# Patient Record
Sex: FEMALE | Race: BLACK OR AFRICAN AMERICAN | NOT HISPANIC OR LATINO | URBAN - METROPOLITAN AREA
[De-identification: names, ages, dates, MRNs, and addresses within clinical notes are randomized per-mention and may not be internally consistent; named-entity substitution may affect disease eponyms.]

---

## 2022-05-03 ENCOUNTER — OFFICE VISIT (OUTPATIENT)
Dept: URGENT CARE | Age: 37
End: 2022-05-03
Payer: COMMERCIAL

## 2022-05-03 VITALS
DIASTOLIC BLOOD PRESSURE: 70 MMHG | WEIGHT: 218.8 LBS | HEIGHT: 65 IN | OXYGEN SATURATION: 98 % | BODY MASS INDEX: 36.46 KG/M2 | HEART RATE: 118 BPM | SYSTOLIC BLOOD PRESSURE: 114 MMHG | TEMPERATURE: 97.8 F

## 2022-05-03 DIAGNOSIS — H61.21 IMPACTED CERUMEN OF RIGHT EAR: Primary | ICD-10-CM

## 2022-05-03 PROCEDURE — 69210 REMOVE IMPACTED EAR WAX UNI: CPT

## 2022-05-03 PROCEDURE — 99203 OFFICE O/P NEW LOW 30 MIN: CPT

## 2022-05-03 NOTE — PROGRESS NOTES
[facility]        NAME: Jany Enrique is a 29 y o  female  : 1985    MRN: [id number]  DATE: May 3, 2022  TIME: 6:55 PM    Assessment and Plan   Impacted cerumen of right ear [H61 21]  1  Impacted cerumen of right ear  Ear cerumen removal         Patient Instructions     Once cerumen removed, no signs of infection were noted  Recommend daily antihistamine  Follow up with PCP in 2-3 days  Proceed to ER if symptoms worsen  Chief Complaint     Chief Complaint   Patient presents with    Earache     pt c/o right ear pain since  pain 5-6/10  No drainage or hearing loss  History of Present Illness     29 y o  F presents with complaint of R ear pain x 2 days  Denies drainage  Denies hearing loss  Denies dizziness or balance issues  Recent URI a week or two ago  Denies fevers or chills  Review of Systems   Review of Systems   Constitutional: Negative for chills, fatigue and fever  HENT: Positive for ear pain  Negative for congestion, ear discharge, facial swelling, hearing loss, nosebleeds, postnasal drip, rhinorrhea, sinus pressure, sinus pain, sneezing, sore throat and trouble swallowing  Eyes: Negative for pain, discharge, redness and visual disturbance  Respiratory: Negative for cough, chest tightness, shortness of breath and wheezing  Cardiovascular: Negative for chest pain and palpitations  Gastrointestinal: Negative for abdominal pain, diarrhea, nausea and vomiting  Genitourinary: Negative for dysuria, flank pain, hematuria and pelvic pain  Musculoskeletal: Negative for arthralgias, back pain and myalgias  Skin: Negative for color change and rash  Neurological: Negative for dizziness, seizures, syncope, weakness, light-headedness, numbness and headaches  Psychiatric/Behavioral: Negative for confusion, hallucinations and sleep disturbance  The patient is not nervous/anxious  All other systems reviewed and are negative          Current Medications No current outpatient medications on file  Current Allergies     Allergies as of 05/03/2022    (No Known Allergies)            The following portions of the patient's history were reviewed and updated as appropriate: allergies, current medications, past family history, past medical history, past social history, past surgical history and problem list      Past Medical History:   Diagnosis Date    H/O hernia repair     History of cholecystectomy        History reviewed  No pertinent surgical history  History reviewed  No pertinent family history  Medications have been verified  Objective   /70   Pulse (!) 118   Temp 97 8 °F (36 6 °C)   Ht 5' 5" (1 651 m)   Wt 99 2 kg (218 lb 12 8 oz)   SpO2 98%   BMI 36 41 kg/m²   No LMP recorded  Physical Exam     Physical Exam  Vitals reviewed  Constitutional:       General: She is not in acute distress  Appearance: Normal appearance  She is not toxic-appearing  HENT:      Head: Normocephalic  Right Ear: External ear normal  There is impacted cerumen  Left Ear: Tympanic membrane and external ear normal   No middle ear effusion  Tympanic membrane is not perforated, erythematous or bulging  Tympanic membrane has normal mobility  Ears:      Comments: Unable to visualize R TM     Nose: No congestion or rhinorrhea  Right Sinus: No maxillary sinus tenderness or frontal sinus tenderness  Left Sinus: No maxillary sinus tenderness or frontal sinus tenderness  Mouth/Throat:      Pharynx: Uvula midline  No oropharyngeal exudate, posterior oropharyngeal erythema or uvula swelling  Tonsils: No tonsillar exudate or tonsillar abscesses  Eyes:      Extraocular Movements: Extraocular movements intact  Conjunctiva/sclera: Conjunctivae normal       Pupils: Pupils are equal, round, and reactive to light  Cardiovascular:      Rate and Rhythm: Normal rate and regular rhythm  Pulses: Normal pulses  Heart sounds: Normal heart sounds  Pulmonary:      Effort: Pulmonary effort is normal  No tachypnea, accessory muscle usage or respiratory distress  Breath sounds: Normal breath sounds and air entry  No decreased air movement  No decreased breath sounds, wheezing, rhonchi or rales  Abdominal:      General: Bowel sounds are normal       Palpations: Abdomen is soft  Tenderness: There is no abdominal tenderness  There is no right CVA tenderness or guarding  Musculoskeletal:         General: Normal range of motion  Cervical back: Normal range of motion  Lymphadenopathy:      Cervical: No cervical adenopathy  Skin:     General: Skin is warm and dry  Neurological:      General: No focal deficit present  Mental Status: She is alert  Cranial Nerves: Cranial nerves are intact  Sensory: Sensation is intact  Motor: Motor function is intact  Coordination: Coordination is intact  Gait: Gait is intact  Deep Tendon Reflexes: Reflexes are normal and symmetric  Ear cerumen removal    Date/Time: 5/3/2022 6:53 PM  Performed by: ABA Regalado  Authorized by: ABA Regalado   Universal Protocol:  Risks and benefits: risks, benefits and alternatives were discussed  Consent given by: patient  Patient understanding: patient states understanding of the procedure being performed      Patient location:  Bedside  Procedure details:     Location:  R ear    Procedure type: irrigation with instrumentation      Instrumentation: curette    Post-procedure details:     Complication:  None    Hearing quality:  Normal    Patient tolerance of procedure:   Tolerated well, no immediate complications  Comments:      No signs of bacterial infection

## 2022-05-07 ENCOUNTER — OFFICE VISIT (OUTPATIENT)
Dept: URGENT CARE | Age: 37
End: 2022-05-07
Payer: COMMERCIAL

## 2022-05-07 VITALS
OXYGEN SATURATION: 99 % | HEART RATE: 86 BPM | WEIGHT: 216 LBS | DIASTOLIC BLOOD PRESSURE: 72 MMHG | RESPIRATION RATE: 16 BRPM | BODY MASS INDEX: 34.72 KG/M2 | TEMPERATURE: 97.8 F | HEIGHT: 66 IN | SYSTOLIC BLOOD PRESSURE: 130 MMHG

## 2022-05-07 DIAGNOSIS — H92.01 RIGHT EAR PAIN: Primary | ICD-10-CM

## 2022-05-07 DIAGNOSIS — G44.209 ACUTE NON INTRACTABLE TENSION-TYPE HEADACHE: ICD-10-CM

## 2022-05-07 PROCEDURE — 99213 OFFICE O/P EST LOW 20 MIN: CPT | Performed by: PREVENTIVE MEDICINE

## 2022-05-07 NOTE — PROGRESS NOTES
[id number]        NAME: Naty Mart is a 29 y o  female  : 1985    MRN: [id number]  DATE: May 7, 2022  TIME: 1:58 PM    Assessment and Plan   Right ear pain [H92 01]  1  Right ear pain     2  Acute non intractable tension-type headache           Patient Instructions       Follow up with PCP in 3-5 days  Proceed to  ER if symptoms worsen  Chief Complaint     Chief Complaint   Patient presents with    Headache     Pt reports right sided headache and jaw pain for approx 3 days following right ear flushing  History of Present Illness       Last several days pain around the right ear in the right side of the face  Also some pains across the top of the scalp and the right forehead  Note, she mentioned she is under lot of stress  Her mother  of pancreatic cancer 6 months ago in tomorrow's mother's Day  Review of Systems   Review of Systems   Constitutional: Negative for fatigue and fever  HENT: Positive for ear pain  Negative for congestion  Neurological: Positive for headaches  Current Medications     No current outpatient medications on file  Current Allergies     Allergies as of 2022    (No Known Allergies)            The following portions of the patient's history were reviewed and updated as appropriate: allergies, current medications, past family history, past medical history, past social history, past surgical history and problem list      Past Medical History:   Diagnosis Date    H/O hernia repair     History of cholecystectomy        No past surgical history on file  No family history on file  Medications have been verified  Objective   /72   Pulse 86   Temp 97 8 °F (36 6 °C)   Resp 16   Ht 5' 6" (1 676 m)   Wt 98 kg (216 lb)   SpO2 99%   BMI 34 86 kg/m²   No LMP recorded         Physical Exam     Physical Exam  HENT:      Right Ear: Tympanic membrane normal       Left Ear: Tympanic membrane and ear canal normal  Mouth/Throat:      Mouth: Mucous membranes are moist       Pharynx: Oropharynx is clear  Eyes:      Pupils: Pupils are equal, round, and reactive to light  Lymphadenopathy:      Cervical: No cervical adenopathy  Neurological:      General: No focal deficit present  Cranial Nerves: No cranial nerve deficit  Sensory: No sensory deficit

## 2022-05-07 NOTE — PATIENT INSTRUCTIONS
The headaches might be a muscular contraction/tension-type headache  Hi getting some regular massages of the head neck  You can use ibuprofen and Tylenol for pain

## 2022-05-19 ENCOUNTER — OFFICE VISIT (OUTPATIENT)
Dept: INTERNAL MEDICINE CLINIC | Facility: OTHER | Age: 37
End: 2022-05-19
Payer: COMMERCIAL

## 2022-05-19 VITALS
WEIGHT: 217 LBS | SYSTOLIC BLOOD PRESSURE: 134 MMHG | BODY MASS INDEX: 34.87 KG/M2 | HEART RATE: 83 BPM | HEIGHT: 66 IN | OXYGEN SATURATION: 99 % | RESPIRATION RATE: 18 BRPM | TEMPERATURE: 97.8 F | DIASTOLIC BLOOD PRESSURE: 78 MMHG

## 2022-05-19 DIAGNOSIS — G44.201 ACUTE INTRACTABLE TENSION-TYPE HEADACHE: Primary | ICD-10-CM

## 2022-05-19 DIAGNOSIS — Z97.5 NEXPLANON IN PLACE: ICD-10-CM

## 2022-05-19 PROBLEM — G44.209 TENSION TYPE HEADACHE: Status: ACTIVE | Noted: 2022-05-19

## 2022-05-19 PROCEDURE — 99203 OFFICE O/P NEW LOW 30 MIN: CPT | Performed by: INTERNAL MEDICINE

## 2022-05-19 RX ORDER — METHYLPREDNISOLONE 4 MG/1
TABLET ORAL
Qty: 21 EACH | Refills: 0 | Status: SHIPPED | OUTPATIENT
Start: 2022-05-19

## 2022-05-19 RX ORDER — IBUPROFEN 800 MG/1
800 TABLET ORAL
COMMUNITY
Start: 2022-05-09

## 2022-05-19 RX ORDER — SUMATRIPTAN 50 MG/1
50 TABLET, FILM COATED ORAL ONCE AS NEEDED
Qty: 9 TABLET | Refills: 0 | Status: SHIPPED | OUTPATIENT
Start: 2022-05-19

## 2022-05-19 RX ORDER — SUMATRIPTAN 50 MG/1
50 TABLET, FILM COATED ORAL ONCE AS NEEDED
Qty: 9 TABLET | Refills: 0 | Status: SHIPPED | OUTPATIENT
Start: 2022-05-19 | End: 2022-05-19

## 2022-05-19 NOTE — ASSESSMENT & PLAN NOTE
Will prescribe Medrol Dosepak, and p r n  sumatriptan for breakthrough headaches  Discussed adequate oral hydration with following a healthy diet and rest full sleep  She is to contact our office for persistent or worsening symptoms  She is currently breastfeeding to which I recommended she hold breast feeding for the next 10 days while on steroids and sumatriptan

## 2022-05-19 NOTE — PROGRESS NOTES
Assessment/Plan:    Tension type headache  Will prescribe Medrol Dosepak, and p r n  sumatriptan for breakthrough headaches  Discussed adequate oral hydration with following a healthy diet and rest full sleep  She is to contact our office for persistent or worsening symptoms  She is currently breastfeeding to which I recommended she hold breast feeding for the next 10 days while on steroids and sumatriptan  Nexplanon in place  Scheduled for removal today  Diagnoses and all orders for this visit:    Acute intractable tension-type headache  -     methylPREDNISolone 4 MG tablet therapy pack; Use as directed on package  -     Discontinue: SUMAtriptan (Imitrex) 50 mg tablet; Take 1 tablet (50 mg total) by mouth once as needed for migraine for up to 1 dose  -     SUMAtriptan (Imitrex) 50 mg tablet; Take 1 tablet (50 mg total) by mouth once as needed for migraine for up to 1 dose    Nexplanon in place    Other orders  -     etonogestrel (NEXPLANON) subdermal implant; Inject under the skin  -     ibuprofen (MOTRIN) 800 mg tablet; Take 800 mg by mouth every 6 to 8 hours as needed for pain                Subjective:      Patient ID: Fernanda Ruiz is a 29 y o  female  Chief Complaint   Patient presents with   [facility]    Headache      headache for about a week and a half  Has other factures that maybe contributing    hm     Hep c, phq, cervical screening, HIV, annual exam, bmi f/u plan       29year old female is seen today with concern for 1 5 weeks of headaches  She has Nexplanon implantation (since 2 years) for birth control however is having it removed today due to side effects (mood swings, headaches)  This is the second consecutive month that she has menstruated  She did not have a headache last month during menstruation  She had a right ear ache 2 weeks ago to which she underwent evaluation at an urgent care to which no infection or cerumen impaction was appreciated     She also underwent a tooth extraction, right upper molar  Her headaches are localized to the bilateral parietal, temporal, and occipital headache  She has been taking ibuprofen for her headaches  Headache      The following portions of the patient's history were reviewed and updated as appropriate: allergies, current medications, past family history, past medical history, past social history, past surgical history and problem list     Review of Systems   Constitutional: Negative for activity change, appetite change, chills, diaphoresis, fatigue and fever  HENT: Negative for congestion, postnasal drip, rhinorrhea, sinus pressure, sinus pain, sneezing and sore throat  Eyes: Negative for visual disturbance  Respiratory: Negative for apnea, cough, choking, chest tightness, shortness of breath and wheezing  Cardiovascular: Negative for chest pain, palpitations and leg swelling  Gastrointestinal: Negative for abdominal distention, abdominal pain, anal bleeding, blood in stool, constipation, diarrhea, nausea and vomiting  Endocrine: Negative for cold intolerance and heat intolerance  Genitourinary: Negative for difficulty urinating, dysuria and hematuria  Musculoskeletal: Negative  Skin: Negative  Neurological: Positive for headaches  Negative for dizziness, weakness, light-headedness and numbness  Hematological: Negative for adenopathy  Psychiatric/Behavioral: Negative for agitation, sleep disturbance and suicidal ideas  All other systems reviewed and are negative          Past Medical History:   Diagnosis Date    H/O hernia repair     History of cholecystectomy          Current Outpatient Medications:     methylPREDNISolone 4 MG tablet therapy pack, Use as directed on package, Disp: 21 each, Rfl: 0    SUMAtriptan (Imitrex) 50 mg tablet, Take 1 tablet (50 mg total) by mouth once as needed for migraine for up to 1 dose, Disp: 9 tablet, Rfl: 0    etonogestrel (NEXPLANON) subdermal implant, Inject under the skin, Disp: , Rfl:     ibuprofen (MOTRIN) 800 mg tablet, Take 800 mg by mouth every 6 to 8 hours as needed for pain, Disp: , Rfl:     Allergies   Allergen Reactions    Sesame Seed (Diagnostic) - Food Allergy Anaphylaxis       Social History   Past Surgical History:   Procedure Laterality Date    DILATION AND CURETTAGE OF UTERUS  2008     Family History   Problem Relation Age of Onset    Pancreatic cancer Mother     Diabetes Paternal Grandmother     Dialysis Paternal Grandmother        Objective:  /78 (BP Location: Left arm, Patient Position: Sitting, Cuff Size: Large)   Pulse 83   Temp 97 8 °F (36 6 °C) (Temporal)   Resp 18   Ht 5' 6" (1 676 m)   Wt 98 4 kg (217 lb)   SpO2 99%   BMI 35 02 kg/m²     No results found for this or any previous visit (from the past 1344 hour(s))  Physical Exam  Vitals and nursing note reviewed  Constitutional:       General: She is not in acute distress  Appearance: She is well-developed  She is not diaphoretic  HENT:      Head: Normocephalic and atraumatic  Eyes:      General:         Right eye: No discharge  Left eye: No discharge  Conjunctiva/sclera: Conjunctivae normal       Pupils: Pupils are equal, round, and reactive to light  Neck:      Thyroid: No thyromegaly  Vascular: No JVD  Cardiovascular:      Rate and Rhythm: Normal rate and regular rhythm  Heart sounds: Normal heart sounds  No murmur heard  No friction rub  No gallop  Pulmonary:      Effort: Pulmonary effort is normal  No respiratory distress  Breath sounds: Normal breath sounds  No wheezing or rales  Chest:      Chest wall: No tenderness  Abdominal:      General: There is no distension  Palpations: Abdomen is soft  Tenderness: There is no abdominal tenderness  Musculoskeletal:         General: No tenderness or deformity  Normal range of motion  Cervical back: Normal range of motion and neck supple  Lymphadenopathy:      Cervical: No cervical adenopathy  Skin:     General: Skin is warm and dry  Coloration: Skin is not pale  Findings: No erythema or rash  Neurological:      Mental Status: She is alert and oriented to person, place, and time  Cranial Nerves: No cranial nerve deficit  Coordination: Coordination normal    Psychiatric:         Behavior: Behavior normal          Thought Content:  Thought content normal          Judgment: Judgment normal

## 2022-07-07 ENCOUNTER — TELEPHONE (OUTPATIENT)
Dept: ADMINISTRATIVE | Facility: OTHER | Age: 37
End: 2022-07-07

## 2022-07-07 NOTE — TELEPHONE ENCOUNTER
----- Message from Donn Ballesteros MA sent at 7/7/2022  7:59 AM EDT -----  Regarding: cervical screening  07/07/22 8:00 AM    Hello, our patient Adolph Prince has had Pap Smear (HPV) aka Cervical Cancer Screening completed/performed  Please assist in updating the patient chart by pulling the Care Everywhere (CE) document  The date of service is 7/29/20       Thank you,  Donn Ballesteros, [address]

## 2022-09-10 ENCOUNTER — APPOINTMENT (OUTPATIENT)
Dept: LAB | Age: 37
End: 2022-09-10
Payer: COMMERCIAL

## 2022-09-10 DIAGNOSIS — Z32.01 PREGNANCY EXAMINATION OR TEST, POSITIVE RESULT: ICD-10-CM

## 2022-09-14 ENCOUNTER — APPOINTMENT (OUTPATIENT)
Dept: LAB | Age: 37
End: 2022-09-14
Payer: COMMERCIAL

## 2022-09-14 DIAGNOSIS — Z32.01 PREGNANCY EXAMINATION OR TEST, POSITIVE RESULT: Primary | ICD-10-CM

## 2022-09-14 LAB
ABO GROUP BLD: NORMAL
B-HCG SERPL-ACNC: >1000 MIU/ML
BLD GP AB SCN SERPL QL: NEGATIVE
RH BLD: POSITIVE
SPECIMEN EXPIRATION DATE: NORMAL

## 2022-09-14 PROCEDURE — 86901 BLOOD TYPING SEROLOGIC RH(D): CPT

## 2022-09-14 PROCEDURE — 86900 BLOOD TYPING SEROLOGIC ABO: CPT

## 2022-09-14 PROCEDURE — 84702 CHORIONIC GONADOTROPIN TEST: CPT

## 2022-09-14 PROCEDURE — 36415 COLL VENOUS BLD VENIPUNCTURE: CPT

## 2022-09-14 PROCEDURE — 86850 RBC ANTIBODY SCREEN: CPT

## 2022-10-22 ENCOUNTER — APPOINTMENT (OUTPATIENT)
Dept: LAB | Age: 37
End: 2022-10-22
Payer: COMMERCIAL

## 2022-10-22 DIAGNOSIS — Z36.89 SCREENING FOR PREGNANCY-ASSOCIATED PLASMA PROTEIN A: ICD-10-CM

## 2022-10-22 LAB
EXTERNAL SYPHILIS TOTAL IGG/IGM SCREENING: NORMAL
GLUCOSE 1H P 50 G GLC PO SERPL-MCNC: 132 MG/DL (ref 40–134)
HBV SURFACE AG SER QL: NORMAL
HCV AB SER QL: NORMAL
RUBV IGG SERPL IA-ACNC: 66.1 IU/ML

## 2022-10-22 PROCEDURE — 87389 HIV-1 AG W/HIV-1&-2 AB AG IA: CPT

## 2022-10-22 PROCEDURE — 86762 RUBELLA ANTIBODY: CPT

## 2022-10-22 PROCEDURE — 87340 HEPATITIS B SURFACE AG IA: CPT

## 2022-10-22 PROCEDURE — 82950 GLUCOSE TEST: CPT

## 2022-10-22 PROCEDURE — 86592 SYPHILIS TEST NON-TREP QUAL: CPT

## 2022-10-22 PROCEDURE — 86787 VARICELLA-ZOSTER ANTIBODY: CPT

## 2022-10-22 PROCEDURE — 86803 HEPATITIS C AB TEST: CPT

## 2022-10-23 LAB — VZV IGG SER QL IA: NORMAL

## 2022-10-24 LAB
HIV 1+2 AB+HIV1 P24 AG SERPL QL IA: NORMAL
RPR SER QL: NORMAL

## 2022-10-25 LAB
EXTERNAL CHLAMYDIA SCREEN: NEGATIVE
EXTERNAL GONORRHEA SCREEN: NEGATIVE

## 2022-11-29 PROBLEM — Z97.5 NEXPLANON IN PLACE: Status: RESOLVED | Noted: 2022-05-19 | Resolved: 2022-11-29

## 2022-12-01 ENCOUNTER — OFFICE VISIT (OUTPATIENT)
Dept: OBGYN CLINIC | Facility: CLINIC | Age: 37
End: 2022-12-01

## 2022-12-01 VITALS
BODY MASS INDEX: 37.83 KG/M2 | SYSTOLIC BLOOD PRESSURE: 120 MMHG | WEIGHT: 235.4 LBS | DIASTOLIC BLOOD PRESSURE: 62 MMHG | HEIGHT: 66 IN

## 2022-12-01 DIAGNOSIS — O99.212 OBESITY AFFECTING PREGNANCY IN SECOND TRIMESTER: ICD-10-CM

## 2022-12-01 DIAGNOSIS — O09.42 GRAND MULTIPARITY WITH CURRENT PREGNANCY, SECOND TRIMESTER: ICD-10-CM

## 2022-12-01 DIAGNOSIS — O09.522 AMA (ADVANCED MATERNAL AGE) MULTIGRAVIDA 35+, SECOND TRIMESTER: Primary | ICD-10-CM

## 2022-12-01 DIAGNOSIS — O09.292 HISTORY OF GESTATIONAL DIABETES MELLITUS (GDM) IN PRIOR PREGNANCY, CURRENTLY PREGNANT IN SECOND TRIMESTER: ICD-10-CM

## 2022-12-01 DIAGNOSIS — Z86.32 HISTORY OF GESTATIONAL DIABETES MELLITUS (GDM) IN PRIOR PREGNANCY, CURRENTLY PREGNANT IN SECOND TRIMESTER: ICD-10-CM
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
## 2022-12-06 ENCOUNTER — OB ABSTRACT (OUTPATIENT)
Dept: OBGYN CLINIC | Facility: CLINIC | Age: 37
End: 2022-12-06

## 2022-12-06 ENCOUNTER — INITIAL PRENATAL (OUTPATIENT)
Dept: OBGYN CLINIC | Facility: CLINIC | Age: 37
End: 2022-12-06

## 2022-12-06 DIAGNOSIS — Z34.82 ENCOUNTER FOR SUPERVISION OF NORMAL PREGNANCY IN MULTIGRAVIDA IN SECOND TRIMESTER: Primary | ICD-10-CM

## 2022-12-06 NOTE — Clinical Note
Intake complete  Transfer with [facility] per patient and Brandie's office note from 12/1/2022 of 5/3/2023

## 2022-12-06 NOTE — PATIENT INSTRUCTIONS
Pregnancy Diet   WHAT YOU NEED TO KNOW:   What is a healthy diet during pregnancy? A healthy diet during pregnancy is a meal plan that provides the amount of calories and nutrients you need during pregnancy  Your body needs extra calories and nutrients to support your growing baby  You need to gain the right amount of weight for a healthy baby and pregnancy  Babies born at a healthy weight have a lower risk of certain health problems at birth and later in life  A healthy diet may help you avoid gaining too much weight  Too much weight gain may cause problems for you during your pregnancy and delivery  What should I avoid or limit while I am pregnant? Do not drink alcohol during pregnancy  Alcohol can increase your risk of a miscarriage (losing your baby)  Your baby may also be born too small and have other health problems, such as learning problems later in life  Do not eat raw or undercooked foods  Examples include meat, poultry, eggs, fish, and shellfish (shrimp, crab, lobster)  Cook leftover foods and ready-to-eat foods such as hot dogs until they are steaming hot  Do not have anything that is not pasteurized  Pasteurization is a heating process that destroys bacteria  Do not have milk, juice, or cheese that has not been pasteurized  Cheeses include Brie, feta, Camembert, blue, and [place] cheeses  Limit caffeine to avoid possible health problems  It is not clear how caffeine affects pregnancy  Your dietitian can tell you how much caffeine is okay for you to have in a day or week  Caffeine may be found in coffee, tea, cola, sports drinks, and chocolate  Limit foods that contain mercury  Mercury is naturally found in almost all types of fish and shellfish  Some types of fish absorb higher levels of mercury that can be harmful to an unborn baby  Eat only fish and shellfish that are low in mercury  Each week, you may eat up to 12 ounces of fish or shellfish that have low levels of mercury   These include shrimp, canned light tuna, salmon, pollock, and catfish  Eat only 6 ounces of albacore (white) tuna per week  Albacore tuna has more mercury than canned tuna  Do not eat shark, swordfish, ace mackerel, or tilefish  Which foods can I eat while I am pregnant? Eat a variety of foods from each of the food groups listed below  Eat healthy foods even if you take a prenatal vitamin every day  Your dietitian will tell you how many servings you should have from each food group each day to get enough calories  The amount of calories you need depends on your daily activity, your weight before pregnancy, and current weight gain  In the first trimester, you usually do not need extra calories  In the second and third trimesters, most women should eat about 300 extra calories each day  Fruits and vegetables:  Half of your plate should contain fruits and vegetables  Fruits:  Choose fresh, canned, or dried fruit as often as possible  1 cup of sliced, diced, cooked, or canned fruit (canned in light syrup or 100% juice)    1 large peach, orange, or banana    ½ cup of dried fruit    1 cup of fruit juice    Vegetables:  Eat more dark green, red, and orange vegetables  Dark green vegetables include broccoli, spinach, kaylynn lettuce, and aline greens  Examples of orange and red vegetables are carrots, sweet potatoes, winter squash, oranges, and red peppers  1 cup of cooked or raw vegetables    1 cup of vegetable juice    2 cups of raw leafy greens    Grains:  Half of the grains you eat each day should be whole grains      Whole grains:      ½ cup of cooked brown rice or cooked oatmeal    1 cup (1 ounce) of whole-grain dry cereal    1 slice of 376% whole-wheat or rye bread    3 cups of popped popcorn    Other grains:      ½ cup of cooked white rice or pasta    ½ of an English muffin    1 small flour or corn tortilla    1 mini-bagel    Dairy foods:  Choose fat-free or low-fat pasteurized dairy foods:    1½ ounces of hard cheese (mozzarella, Swiss, cheddar)    1 cup (8 ounces) of low-fat or fat-free milk or yogurt    1 cup of low-fat frozen yogurt or pudding    Meat and other protein sources:  Choose lean meats and poultry  Bake, broil, and grill meat instead of frying it  Include a variety of mercury-free seafood in place of some meat and poultry each week  Eat a variety of protein foods:    ½ ounce of nuts (12 almonds, 24 pistachios, 7 walnut halves) or 1 tablespoon of peanut butter (1 ounce)    ¼ cup of soy tofu or tempeh (1 ounce)    1 egg    ¼ cup of cooked dried beans, peas, or lentils (1 ounce)    1 small chicken breast or 1 small trout (about 3 ounces)    1 salmon steak (4 to 6 ounces)    1 small lean hamburger (2 to 3 ounces)    Fats:  Limit saturated fats, trans fats, and cholesterol  These unhealthy fats are found in shortening, butter, stick margarine, and animal fat  Choose healthy fats such as polyunsaturated and monounsaturated fats:    1 tablespoon of canola, olive, corn, sunflower, or soybean oil    1 tablespoon of soft margarine    1 teaspoon of mayonnaise    2 tablespoons of salad dressing    ½ of an avocado    What do I need to know about vitamin and mineral supplements? Your healthcare provider will tell you if you need a supplement and the type you should take  Talk to your provider before you take any other kind of supplement, including herbal (natural) supplements  The following are general guidelines:  Folic acid is one of the most important vitamins during pregnancy  Your prenatal vitamins will also contain folic acid  Make sure you take your prenatal vitamin every day  If you forget to take your vitamin, do not take double the amount the next day  You need at least 564 mcg of folic acid each day before you get pregnant  Folic acid helps to form your baby's brain and spinal cord in early pregnancy  During pregnancy, your daily need for folic acid increases to about 600 mcg   Get folic acid each day by eating citrus fruits and juices, green leafy vegetables, liver, or dried beans  Folic acid is also added to foods such as breakfast cereals, bread products, flour, and pasta  You need about 30 mg of iron each day during pregnancy  Iron is a mineral the body needs to make hemoglobin, which is a part of red blood cells  Hemoglobin helps your blood carry oxygen from the lungs to the rest of your body  Foods that are good sources of iron are meat, poultry, fish, beans, spinach, and fortified cereals and breads  Your body will absorb iron better from non-meat sources if you have a source of vitamin C at the same time  Drink tea and coffee separately from iron-fortified foods and iron supplements  Calcium and vitamin D needs go up during pregnancy  Women who do not eat dairy products may need a calcium and vitamin D supplement  Talk to your dietitian about calcium supplements if you do not regularly eat good sources of calcium  The amount of calcium you need is about 1,300 mg if you are between 15and 25years old and 1,000 mg if you are 23to 48years old  If you cannot drink milk or eat dairy foods, try lactose-free or lactose-reduced milk or calcium-fortified soy milk  Ask your dietitian about pills you can take to help you digest milk products  Eat other drinks and foods that are fortified with calcium, such as orange juice  What diet changes may help morning sickness? Morning sickness is common during the first few months of pregnancy  You may feel nauseated, and you may vomit several times each day  To improve symptoms of morning sickness, eat small meals often instead of 3 large meals  Foods high in carbohydrate, such as crackers, dry toast, and pasta, may be easier for you to eat  Drink liquids between meals rather than with meals  What diet changes may help constipation? A high-fiber diet can improve the symptoms of constipation   Whole-grain breakfast cereals, whole-grain breads, and prune juice are high in fiber  Raw fruits and vegetables, and cooked beans are also good sources of fiber  It may also be helpful to increase your intake of fluids and get regular physical activity  Talk with your healthcare provider before you begin any exercise program        What diet changes may help heartburn? To improve the symptoms of heartburn, do not lie down right after you eat  When you do lie down, sleep with your head slightly elevated  Eat small, frequent meals instead of 3 large meals  It may also be helpful to avoid caffeine, chocolate, and spicy foods  What other healthy guidelines should I follow? Make sure you get enough protein, vitamin B12, and iron if you are a vegetarian or vegan  Some non-meat sources of these nutrients are fortified cereals, nut butters, soy products (tofu and soymilk), nuts, grains, and legumes  These nutrients are also found in eggs and milk products  Talk to your dietitian about how to manage cravings for certain foods  Foods that are high in calories, fat, and sugar should not replace healthy food choices  Some women have cravings for unusual substances such as óscar, dirt, laundry starch, ice, and chalk  This condition is called pica  These may lead to health problems such as anemia and cause other health problems  When should I call my dietitian or obstetrician? You are losing weight without trying  You have cravings for substances such as óscar, dirt, laundry starch, or ice  You have questions or concerns about your condition or care  CARE AGREEMENT:   You have the right to help plan your care  Discuss treatment options with your healthcare provider to decide what care you want to receive  You always have the right to refuse treatment  The above information is an  only  It is not intended as medical advice for individual conditions or treatments   Talk to your doctor, nurse or pharmacist before following any medical regimen to see if it is safe and effective for you  © Copyright [employer] 2022 Information is for End User's use only and may not be sold, redistributed or otherwise used for commercial purposes  All illustrations and images included in CareNotes® are the copyrighted property of A D A M , Inc  or [address]  Pregnancy   WHAT YOU NEED TO KNOW:   What do I need to know about pregnancy? A normal pregnancy lasts about 40 weeks  The first trimester lasts from your last period through the 12th week of pregnancy  The second trimester lasts from the 13th week through the 23rd week  The third trimester lasts from the 24th week until your baby is born  If you know the date of your last period, your healthcare provider can estimate your due date  You may give birth to your baby any time from 37 weeks to 2 weeks after your due date  What is prenatal care? Prenatal care is a series of visits with your healthcare provider throughout your pregnancy  Prenatal care can help prevent problems during pregnancy and childbirth  At each prenatal visit, your healthcare provider will weigh you and check your blood pressure  He or she will also check your baby's heartbeat and growth  You may need the following at some visits:  A pelvic exam  allows your healthcare provider to see your cervix (the bottom part of your uterus)  Your provider will use a speculum to open your vagina  He or she will check the size and shape of your uterus  At your first prenatal visit, you may also have a Pap smear  This is a test to check your cervix for abnormal cells  Blood tests  may be done to check for any of the following:    Gestational diabetes or anemia (low iron level)    Blood type or Rh factor, or certain birth defects    Immunity to certain diseases, such as chickenpox or rubella    An infection, such as a sexually transmitted infection, HIV, or hepatitis B    Hepatitis B  may need to be prevented or treated   Hepatitis B is inflammation of the liver caused by the hepatitis B virus (HBV)  HBV can spread from a mother to her baby during delivery  You will be checked for HBV as early as possible in the first trimester of each pregnancy  You need the test even if you received the hepatitis B vaccine or were tested before  You may need to have an HBV infection treated before you give birth  Urine tests  may also be done to check for sugar and protein  These can be signs of gestational diabetes or preeclampsia  Urine tests may also be done to check for signs of infection  A gestational diabetes screen  may be done  Your healthcare provider may order either a 1-step or 2-step oral glucose tolerance test (OGTT)  1-step OGTT:  Your blood sugar level will be tested after you have not eaten for 8 hours (fasting)  You will then be given a glucose drink  Your level will be tested again 1 hour and 2 hours after you finish the drink  2-step OGTT:  You do not have to fast for the first part of the test  You will have the glucose drink at any time of day  Your blood sugar level will be checked 1 hour later  If your blood sugar is higher than a certain level, another test will be ordered  You will fast and your blood sugar level will be tested  You will have the glucose drink  Your blood will be tested again 1 hour, 2 hours, and 3 hours after you finish the glucose drink  A fetal ultrasound  shows pictures of your baby inside your uterus  The pictures are used to check your baby's development, movement, and position  Genetic disorder screening tests  may be offered to you  These tests check your baby's risk for genetic disorders such as Down syndrome  A screening test may include blood tests and an ultrasound  Blood tests may be used to check your DNA or your partner's DNA  Genetic tests are not always accurate or complete  Your baby may be born with a genetic disorder that did not show up in the tests   Talk to your healthcare provider about any concerns you have with genetic testing  What can I do to have a healthy pregnancy? Eat a variety of healthy foods  Healthy foods include fruits, vegetables, whole-grain breads, low-fat dairy foods, beans, lean meats, and fish  Drink liquids as directed  Ask how much liquid to drink each day and which liquids are best for you  Limit caffeine to less than 200 milligrams each day  Limit your intake of fish to 2 servings each week  Choose fish low in mercury such as canned light tuna, shrimp, crab, salmon, cod, or tilapia  Do not  eat fish high in mercury such as swordfish, tilefish, ace mackerel, and shark  Take prenatal vitamins as directed  Your need for certain vitamins and minerals, such as folic acid, increases during pregnancy  Prenatal vitamins provide some of the extra vitamins and minerals you need  Prenatal vitamins may also help to decrease the risk for certain birth defects  Ask how much weight you should gain during your pregnancy  Too much or too little weight gain can be unhealthy for you and your baby  Talk to your healthcare provider about exercise  Moderate exercise can help you stay fit  Your healthcare provider will help you plan an exercise program that is safe for you during pregnancy  Do not smoke  Smoking increases your risk for a miscarriage and heart and blood vessel problems  Smoking can cause your baby to be born too early or weigh less at birth  Quit smoking as soon as you think you might be pregnant  Ask your healthcare provider for information if you need help quitting  Do not drink alcohol  Alcohol passes from your body to your baby through the placenta  It can affect your baby's brain development and cause fetal alcohol syndrome (FAS)  FAS is a group of conditions that causes mental, behavior, and growth problems  Talk to your healthcare provider before you take any medicines    Many medicines may harm your baby if you take them when you are pregnant  Do not take any medicines, vitamins, herbs, or supplements without first talking to your healthcare provider  Never use illegal or street drugs (such as marijuana or cocaine) while you are pregnant  What body changes may happen during my pregnancy? Breast changes  you will experience include tenderness and tingling during the early part of your pregnancy  Your breasts will become larger  You may need to use a support bra  You may see a thin, yellow fluid, called colostrum, leak from your nipples during the second trimester  Colostrum is a liquid that changes to milk about 3 days after you give birth  Skin changes and stretch marks  may occur during your pregnancy  You may have red marks, called stretch marks, on your skin  Stretch marks will usually fade after pregnancy  Use lotion if your skin is dry and itchy  The skin on your face, around your nipples, and below your belly button may darken  Most of the time, your skin will return to its normal color after your baby is born  Morning sickness  is nausea and vomiting that can happen at any time of day  Avoid fatty and spicy foods  Eat small meals throughout the day instead of large meals  Domi may help to decrease nausea  Ask your healthcare provider about other ways of decreasing nausea and vomiting  Heartburn  may be caused by changes in your hormones during pregnancy  Your growing uterus may also push your stomach upward and force stomach acid to back up into your esophagus  Eat 4 or 5 small meals each day instead of large meals  Avoid spicy foods  Avoid eating right before bedtime  Constipation  may develop during your pregnancy  To treat constipation, eat foods high in fiber such as fiber cereals, beans, fruits, vegetables, whole-grain breads, and prune juice  Get regular exercise and drink plenty of water  Your healthcare provider may also suggest a fiber supplement to soften your bowel movements   Talk to your healthcare provider before you use any medicines to decrease constipation  Hemorrhoids  are enlarged veins in the rectal area  They may cause pain, itching, and bright red bleeding from your rectum  To decrease your risk for hemorrhoids, prevent constipation and do not strain to have a bowel movement  If you have hemorrhoids, soak in a tub of warm water to ease discomfort  Ask your healthcare provider how you can treat hemorrhoids  Leg cramps and swelling  may be caused by low calcium levels or the added weight of pregnancy  Raise your legs above the level of your heart to decrease swelling  During a leg cramp, stretch or massage the muscle that has the cramp  Heat may help decrease pain and muscle spasms  Apply heat on your muscle for 20 to 30 minutes every 2 hours for as many days as directed  Back pain  may occur as your baby grows  Do not stand for long periods of time or lift heavy items  Use good posture while you stand, squat, or bend  Wear low-heeled shoes with good support  Rest may also help to relieve back pain  Ask your healthcare provider about exercises you can do to strengthen your back muscles  What are some safety tips during pregnancy? Avoid hot tubs and saunas  Do not use a hot tub or sauna while you are pregnant, especially during your first trimester  Hot tubs and saunas may raise your baby's temperature and increase the risk for birth defects  Avoid toxoplasmosis  This is an infection caused by eating raw meat or being around infected cat feces  It can cause birth defects, miscarriages, and other problems  Wash your hands after you touch raw meat  Make sure any meat is well-cooked before you eat it  Avoid raw eggs and unpasteurized milk  Use gloves or ask someone else to clean your cat's litter box while you are pregnant  Ask your healthcare provider about travel  The most comfortable time to travel is during the second trimester   Ask your healthcare provider if you can travel after 36 weeks  You may not be able to travel in an airplane after 36 weeks  He or she may also recommend that you avoid long road trips  When should I seek immediate care? You develop a severe headache that does not go away  You have new or increased vision changes, such as blurred or spotted vision  You have new or increased swelling in your face or hands  You have pain or cramping in your abdomen or low back  You have vaginal bleeding  When should I call my doctor or obstetrician? You have abdominal cramps, pressure, or tightening  You have a change in vaginal discharge  You cannot keep food or drinks down, and you are losing weight  You have chills or a fever  You have vaginal itching, burning, or pain  You have yellow, green, white, or foul-smelling vaginal discharge  You have pain or burning when you urinate, less urine than usual, or pink or bloody urine  You have questions or concerns about your condition or care  CARE AGREEMENT:   You have the right to help plan your care  Learn about your health condition and how it may be treated  Discuss treatment options with your healthcare providers to decide what care you want to receive  You always have the right to refuse treatment  The above information is an  only  It is not intended as medical advice for individual conditions or treatments  Talk to your doctor, nurse or pharmacist before following any medical regimen to see if it is safe and effective for you  © Copyright [employer] 2022 Information is for End User's use only and may not be sold, redistributed or otherwise used for commercial purposes  All illustrations and images included in CareNotes® are the copyrighted property of [employer]  or [employer]  Nausea and Vomiting in Pregnancy   WHAT YOU NEED TO KNOW:   What do I need to know about nausea and vomiting in pregnancy?   Nausea and vomiting can happen any time of day  These symptoms usually start before the 9th week of pregnancy, and end by the 14th week (second trimester)  Some women can have nausea and vomiting for a longer time  These symptoms can affect some women throughout the entire pregnancy  Nausea and vomiting do not harm your baby  These symptoms can make it hard for you to do your daily activities  What causes or increases my risk for nausea and vomiting in pregnancy? The cause of nausea and vomiting in pregnancy is not known  Pregnancy causes changes in your hormones that may lead to nausea and vomiting  The following may increase your risk of nausea and vomiting:  Being pregnant with more than one baby    Nausea and vomiting in a past pregnancy    Having a sister or mother who had nausea and vomiting during pregnancy    History of migraine headaches or motion sickness    Being pregnant with a female baby    How is nausea and vomiting in pregnancy treated? Treatment for nausea and vomiting in pregnancy is usually not needed  You can make changes in the foods you eat and in your activities to help manage your symptoms  You may need to try several things to learn what works for you  Talk to your healthcare provider if your symptoms do not decrease with the changes suggested below  You may need vitamin B6 and medicine if these changes do not help, or your symptoms become severe  What nutrition changes can I make to manage nausea and vomiting? Eat small meals throughout the day instead of 3 large meals  You may be more likely to have nausea and vomiting when your stomach is empty  Eat foods that are low in fat and high in protein  Examples are lean meat, beans, turkey, and chicken without the skin  Eat a small snack, such as crackers, dry cereal, or a small sandwich before you go to bed  Eat some crackers or dry toast before you get out of bed in the morning  Get out of bed slowly   Sudden movements could cause you to get dizzy and nauseated  Eat bland foods when you feel nauseated  Examples of bland foods include dry toast, dry cereal, plain pasta, white rice, and bread  Other bland foods include saltine crackers, bananas, gelatin, and pretzels  Avoid spicy, greasy, and fried foods  Avoid any other foods that make you feel nauseated  Drink liquids that contain ginger  Drink ginger ale made with real ginger or ginger tea made with fresh grated ginger  Ginger capsules or ginger candies may also help to decrease nausea and vomiting  Drink liquids between meals instead of with meals  Wait at least 30 minutes after you eat to drink liquids  Drink small amounts of liquids often throughout the day to prevent dehydration  Ask how much liquid you should drink each day  What other changes can I make to manage nausea and vomiting? Avoid smells that bother you  Strong odors may cause nausea and vomiting to start, or make it worse  Take a short walk, turn on a fan, or try to sleep with the window open to get fresh air  When you are cooking, open windows to get rid of smells that may cause nausea  Do not brush your teeth right after you eat  if it makes you nauseated  Rest when you need to  Start activity slowly and work up to your usual routine as you start to feel better  Talk to your healthcare provider about your prenatal vitamins  Prenatal vitamins can cause nausea for some women  Try taking your prenatal vitamin at night or with a snack  If this change does not help, your healthcare provider may recommend a different type of vitamin  Do not use any medicines, vitamins, or supplements to manage your symptoms without asking your healthcare provider  Many medicines can harm an unborn baby  Light to moderate exercise  may help to decrease your symptoms  It may also help you to sleep better at night  Ask your healthcare provider about the best exercise plan for you  When should I seek immediate care?    You have signs of dehydration  Examples are dark yellow urine, dry mouth and lips, dry skin, fast heartbeat, and urinating less than usual     You have severe abdominal pain  You feel too weak or dizzy to stand up  You see blood in your vomit or bowel movements  When should I call my doctor? You vomit more than 4 times in 1 day  You have not been able to keep liquids down for more than 1 day  You lose more than 2 pounds  You have a fever  Your nausea and vomiting continue longer than 14 weeks  You have questions or concerns about your condition or care  CARE AGREEMENT:   You have the right to help plan your care  Learn about your health condition and how it may be treated  Discuss treatment options with your healthcare providers to decide what care you want to receive  You always have the right to refuse treatment  The above information is an  only  It is not intended as medical advice for individual conditions or treatments  Talk to your doctor, nurse or pharmacist before following any medical regimen to see if it is safe and effective for you  © Copyright [employer] 2022 Information is for End User's use only and may not be sold, redistributed or otherwise used for commercial purposes  All illustrations and images included in CareNotes® are the copyrighted property of [employer]  or [address]  Pregnancy Diet   WHAT YOU NEED TO KNOW:   What is a healthy diet during pregnancy? A healthy diet during pregnancy is a meal plan that provides the amount of calories and nutrients you need during pregnancy  Your body needs extra calories and nutrients to support your growing baby  You need to gain the right amount of weight for a healthy baby and pregnancy  Babies born at a healthy weight have a lower risk of certain health problems at birth and later in life  A healthy diet may help you avoid gaining too much weight   Too much weight gain may cause problems for you during your pregnancy and delivery  What should I avoid or limit while I am pregnant? Do not drink alcohol during pregnancy  Alcohol can increase your risk of a miscarriage (losing your baby)  Your baby may also be born too small and have other health problems, such as learning problems later in life  Do not eat raw or undercooked foods  Examples include meat, poultry, eggs, fish, and shellfish (shrimp, crab, lobster)  Cook leftover foods and ready-to-eat foods such as hot dogs until they are steaming hot  Do not have anything that is not pasteurized  Pasteurization is a heating process that destroys bacteria  Do not have milk, juice, or cheese that has not been pasteurized  Cheeses include Brie, feta, Camembert, blue, and [place] cheeses  Limit caffeine to avoid possible health problems  It is not clear how caffeine affects pregnancy  Your dietitian can tell you how much caffeine is okay for you to have in a day or week  Caffeine may be found in coffee, tea, cola, sports drinks, and chocolate  Limit foods that contain mercury  Mercury is naturally found in almost all types of fish and shellfish  Some types of fish absorb higher levels of mercury that can be harmful to an unborn baby  Eat only fish and shellfish that are low in mercury  Each week, you may eat up to 12 ounces of fish or shellfish that have low levels of mercury  These include shrimp, canned light tuna, salmon, pollock, and catfish  Eat only 6 ounces of albacore (white) tuna per week  Albacore tuna has more mercury than canned tuna  Do not eat shark, swordfish, ace mackerel, or tilefish  Which foods can I eat while I am pregnant? Eat a variety of foods from each of the food groups listed below  Eat healthy foods even if you take a prenatal vitamin every day  Your dietitian will tell you how many servings you should have from each food group each day to get enough calories   The amount of calories you need depends on your daily activity, your weight before pregnancy, and current weight gain  In the first trimester, you usually do not need extra calories  In the second and third trimesters, most women should eat about 300 extra calories each day  Fruits and vegetables:  Half of your plate should contain fruits and vegetables  Fruits:  Choose fresh, canned, or dried fruit as often as possible  1 cup of sliced, diced, cooked, or canned fruit (canned in light syrup or 100% juice)    1 large peach, orange, or banana    ½ cup of dried fruit    1 cup of fruit juice    Vegetables:  Eat more dark green, red, and orange vegetables  Dark green vegetables include broccoli, spinach, kaylynn lettuce, and aline greens  Examples of orange and red vegetables are carrots, sweet potatoes, winter squash, oranges, and red peppers  1 cup of cooked or raw vegetables    1 cup of vegetable juice    2 cups of raw leafy greens    Grains:  Half of the grains you eat each day should be whole grains  Whole grains:      ½ cup of cooked brown rice or cooked oatmeal    1 cup (1 ounce) of whole-grain dry cereal    1 slice of 374% whole-wheat or rye bread    3 cups of popped popcorn    Other grains:      ½ cup of cooked white rice or pasta    ½ of an English muffin    1 small flour or corn tortilla    1 mini-bagel    Dairy foods:  Choose fat-free or low-fat pasteurized dairy foods:    1½ ounces of hard cheese (mozzarella, Swiss, cheddar)    1 cup (8 ounces) of low-fat or fat-free milk or yogurt    1 cup of low-fat frozen yogurt or pudding    Meat and other protein sources:  Choose lean meats and poultry  Bake, broil, and grill meat instead of frying it  Include a variety of mercury-free seafood in place of some meat and poultry each week   Eat a variety of protein foods:    ½ ounce of nuts (12 almonds, 24 pistachios, 7 walnut halves) or 1 tablespoon of peanut butter (1 ounce)    ¼ cup of soy tofu or tempeh (1 ounce)    1 egg    ¼ cup of cooked dried beans, peas, or lentils (1 ounce)    1 small chicken breast or 1 small trout (about 3 ounces)    1 salmon steak (4 to 6 ounces)    1 small lean hamburger (2 to 3 ounces)    Fats:  Limit saturated fats, trans fats, and cholesterol  These unhealthy fats are found in shortening, butter, stick margarine, and animal fat  Choose healthy fats such as polyunsaturated and monounsaturated fats:    1 tablespoon of canola, olive, corn, sunflower, or soybean oil    1 tablespoon of soft margarine    1 teaspoon of mayonnaise    2 tablespoons of salad dressing    ½ of an avocado    What do I need to know about vitamin and mineral supplements? Your healthcare provider will tell you if you need a supplement and the type you should take  Talk to your provider before you take any other kind of supplement, including herbal (natural) supplements  The following are general guidelines:  Folic acid is one of the most important vitamins during pregnancy  Your prenatal vitamins will also contain folic acid  Make sure you take your prenatal vitamin every day  If you forget to take your vitamin, do not take double the amount the next day  You need at least 660 mcg of folic acid each day before you get pregnant  Folic acid helps to form your baby's brain and spinal cord in early pregnancy  During pregnancy, your daily need for folic acid increases to about 600 mcg  Get folic acid each day by eating citrus fruits and juices, green leafy vegetables, liver, or dried beans  Folic acid is also added to foods such as breakfast cereals, bread products, flour, and pasta  You need about 30 mg of iron each day during pregnancy  Iron is a mineral the body needs to make hemoglobin, which is a part of red blood cells  Hemoglobin helps your blood carry oxygen from the lungs to the rest of your body  Foods that are good sources of iron are meat, poultry, fish, beans, spinach, and fortified cereals and breads   Your body will absorb iron better from non-meat sources if you have a source of vitamin C at the same time  Drink tea and coffee separately from iron-fortified foods and iron supplements  Calcium and vitamin D needs go up during pregnancy  Women who do not eat dairy products may need a calcium and vitamin D supplement  Talk to your dietitian about calcium supplements if you do not regularly eat good sources of calcium  The amount of calcium you need is about 1,300 mg if you are between 15and 25years old and 1,000 mg if you are 23to 48years old  If you cannot drink milk or eat dairy foods, try lactose-free or lactose-reduced milk or calcium-fortified soy milk  Ask your dietitian about pills you can take to help you digest milk products  Eat other drinks and foods that are fortified with calcium, such as orange juice  What diet changes may help morning sickness? Morning sickness is common during the first few months of pregnancy  You may feel nauseated, and you may vomit several times each day  To improve symptoms of morning sickness, eat small meals often instead of 3 large meals  Foods high in carbohydrate, such as crackers, dry toast, and pasta, may be easier for you to eat  Drink liquids between meals rather than with meals  What diet changes may help constipation? A high-fiber diet can improve the symptoms of constipation  Whole-grain breakfast cereals, whole-grain breads, and prune juice are high in fiber  Raw fruits and vegetables, and cooked beans are also good sources of fiber  It may also be helpful to increase your intake of fluids and get regular physical activity  Talk with your healthcare provider before you begin any exercise program        What diet changes may help heartburn? To improve the symptoms of heartburn, do not lie down right after you eat  When you do lie down, sleep with your head slightly elevated  Eat small, frequent meals instead of 3 large meals   It may also be helpful to avoid caffeine, chocolate, and spicy foods  What other healthy guidelines should I follow? Make sure you get enough protein, vitamin B12, and iron if you are a vegetarian or vegan  Some non-meat sources of these nutrients are fortified cereals, nut butters, soy products (tofu and soymilk), nuts, grains, and legumes  These nutrients are also found in eggs and milk products  Talk to your dietitian about how to manage cravings for certain foods  Foods that are high in calories, fat, and sugar should not replace healthy food choices  Some women have cravings for unusual substances such as óscar, dirt, laundry starch, ice, and chalk  This condition is called pica  These may lead to health problems such as anemia and cause other health problems  When should I call my dietitian or obstetrician? You are losing weight without trying  You have cravings for substances such as óscar, dirt, laundry starch, or ice  You have questions or concerns about your condition or care  CARE AGREEMENT:   You have the right to help plan your care  Discuss treatment options with your healthcare provider to decide what care you want to receive  You always have the right to refuse treatment  The above information is an  only  It is not intended as medical advice for individual conditions or treatments  Talk to your doctor, nurse or pharmacist before following any medical regimen to see if it is safe and effective for you  © Copyright [employer] 2022 Information is for End User's use only and may not be sold, redistributed or otherwise used for commercial purposes   All illustrations and images included in CareNotes® are the copyrighted property of [employer]  or [address] [place] [address]

## 2022-12-06 NOTE — PROGRESS NOTES
OB INTAKE INTERVIEW    The patient was identified by name and date of birth and was informed that this is a virtual visit being conducted through a secure, HIPAA-complaint platform  I am in a private office space with the door closed  Patient acknowledged understanding of privacy  She agrees to proceed and is aware that she may discontinue the visit at any time  OB History    Para Term  AB Living   7 4 4 0 2 4   SAB IAB Ectopic Multiple Live Births   1 0 1 0 4      # Outcome Date GA Lbr Cesar/2nd Weight Sex Delivery Anes PTL Lv   7 Current            6 Term 2020 40w6d  3921 g (8 lb 10 3 oz) M Vag-Spont  N EDER   5 Term 2019 39w1d  3487 g (7 lb 11 oz) M Vag-Spont  N EDER   4 Ectopic 2017              Birth Comments: ? chemical pregnancy   3 Term 2011 40w0d  3751 g (8 lb 4 3 oz) F Vag-Spont  N EDER   2 Term 10/2009 40w0d  2892 g (6 lb 6 oz) M   N EDER   1 SAB 2008     SAB         Birth Comments: D&C       Estimated Date of Delivery: 5/3/2023 based on US per patient-transfer of care from [address] [place] [address]  Patient initiated release of records from [address] [place] [address]  Hanh Milan is a 40 y o  female  9 para 3 pregnant female who presents for her obstetrical intake  This pregnancy was planned  Father of the baby is involved  Pregnancy symptoms: breast tenderness and frequent urination            BMI: Body mass index is 37 99 based on last visit  Discussed appropriate weight gain in pregnancy based on pre-gravid BMI       Diabetes              Pregestational DM:  no              hx of GDM: YES-insulin mgd GDM past pregnancy-1 hour gtt done this pregnancy with routine lab work was negative              first degree relative with type 2 diabetes:no              hx of PCOS: no              prior hx of LGA/macrosomia:no per patient         Hypertension   Age 28 or older:YES   Obesity (BMI over 30):YES              Hx of chronic HTN: no              hx of gestational HTN:no hx of preeclampsia, eclampsia, or HELLP syndrome: yes-half sisiter              Family h/o preeclampsia:no  Renal Disease:no  Autoimmune disease (systemic lupus erythematosus, antiphospholipid antibody syndrome):no   Nulliparity:no              Multifetal gestation: no  More than 10 year pregnancy interval:no  Previous IUGR, low birthweight or small for gestational age:no     Infection Screening              Does the pt have a hx of MRSA? no              Recent travel outside of [place]? no     Thyroid- if yes order TSH with reflex T4  History of thyroid disease no    Bleeding Disorder or Hx of DVT-patient or first degree relative with history of  Order the following if not done previously  (Factor V, antithrombin III, prothrombin gene mutation, protein C and S Ag, lupus anticoagulant, anticardiolipin, beta-2 glycoprotein)   no    OB/GYN-  History of abnormal pap smear YES-2020, ASCUS/ neg HPV  History of HPV no  History of Herpes/HSV no  History of other STI (gonorrhea, chlamydia, trich) YES CT-2013    History of prior  YES  History of prior  no  History of  delivery prior to 36 weeks 6 days no    History of blood transfusion no  Ok for blood transfusion Yes     Substance screening- if yes outside of tobacco for her or anyone in her home-order urine drug screen    History of tobacco use-non-smoker  Currently using alcohol- no  Presently using drugs- no  Past drug use- no  IV drug use-If yes add Hep C antibody to labs-no    Genetic/MFM-    See prenatal genetic history screening in the prenatal episode for genetic history  Discussed carrier screening and consultation with MFM  Patient is not interested with previous provider  Referral to MFM given  Referral provided by Devon Womack at [facility] on 2022  Prenatal lab work was ordered by previous provider  Additional labs ordered: none at this time  Breast Feeding  Breastfeeding benefits discussed and patient does desire to breastfeed  Immunizations:  Discussed Flu, COVID, and Tdap, vaccinations  Interview education  Anticipated course of prenatal care including how and when to contact providers reviewed  HIV and other prenatal labs and testing discussed  Genetic testing discussed  Patient will check insurance coverage  Indications for ultrasonography reviewed  Use of any medications (including supplements, vitamins, herbs or OTC drugs) discussed  Reinforced daily use of prenatal vitamin  Influenza, Covid, and Tdap vaccines counseled and recommendations reviewed  Proper dental care discussed  Weight gain counseling discussed including nutrition counseling; special diet, dietary precautions (mercury, listeriosis)  Reviewed exercise recommendations and routine restrictions for activity including no lifting greater than 20 lbs   Environmental/work hazards reviewed including temperature guidelines and no prolonged stationary standing  Avoidance of saunas, hot tubs, and tanning beds reviewed  Toxoplasmosis precautions (cats/raw meat/unwashed vegetables) discussed  Tobacco/smoking, alcohol, and illicit/recreation drug usage reviewed  Travel safety precautions reviewed including avoiding travel where risk of congenitally transmitted infection(s) is high  Proper seat belt use discussed  Pregnancy packet/folder provided and review with patient  Information on childbirth classes/hospital facilities and Baby and Me resources provided  The patient was oriented to our practice, reviewed delivering physicians and [facility]  All questions were answered  Patient to return to the office for first routine prenatal appointment with the provider  This appointment is scheduled  Additional details that I feel the provider should be aware of: Brooklyn Kern to establish care on 12/1/2022  Transferred from [facility] and we are awaiting records    History of ectopic pregnancy, gestational diabetes in last pregnancy, and AMA      Interviewed by: Zelda Sifuentes LPN

## 2022-12-08 ENCOUNTER — OB ABSTRACT (OUTPATIENT)
Dept: OBGYN CLINIC | Facility: CLINIC | Age: 37
End: 2022-12-08

## 2022-12-23 ENCOUNTER — TELEPHONE (OUTPATIENT)
Dept: PERINATAL CARE | Facility: OTHER | Age: 37
End: 2022-12-23

## 2022-12-23 NOTE — TELEPHONE ENCOUNTER
Left patient a message that her MFM appointment had to be rescheduled  The new time, date and location were provided - 12/27/22  8:00  [place]  The patient has been instructed to please call us back at [phone] with any questions or concerns

## 2022-12-26 NOTE — PROGRESS NOTES
Please refer to the [facility] ultrasound report in Ob Procedures for additional information regarding today's visit

## 2022-12-27 ENCOUNTER — ROUTINE PRENATAL (OUTPATIENT)
Dept: PERINATAL CARE | Facility: OTHER | Age: 37
End: 2022-12-27

## 2022-12-27 VITALS
BODY MASS INDEX: 38.73 KG/M2 | DIASTOLIC BLOOD PRESSURE: 64 MMHG | SYSTOLIC BLOOD PRESSURE: 102 MMHG | HEART RATE: 106 BPM | HEIGHT: 66 IN | WEIGHT: 241 LBS

## 2022-12-27 DIAGNOSIS — O09.522 AMA (ADVANCED MATERNAL AGE) MULTIGRAVIDA 35+, SECOND TRIMESTER: Primary | ICD-10-CM

## 2022-12-27 DIAGNOSIS — O09.292 HISTORY OF GESTATIONAL DIABETES MELLITUS (GDM) IN PRIOR PREGNANCY, CURRENTLY PREGNANT IN SECOND TRIMESTER: ICD-10-CM

## 2022-12-27 DIAGNOSIS — O99.212 OBESITY AFFECTING PREGNANCY IN SECOND TRIMESTER: ICD-10-CM

## 2022-12-27 DIAGNOSIS — Z86.32 HISTORY OF GESTATIONAL DIABETES MELLITUS (GDM) IN PRIOR PREGNANCY, CURRENTLY PREGNANT IN SECOND TRIMESTER: ICD-10-CM

## 2022-12-27 DIAGNOSIS — Z3A.21 21 WEEKS GESTATION OF PREGNANCY: ICD-10-CM

## 2022-12-27 DIAGNOSIS — Z36.86 ENCOUNTER FOR ANTENATAL SCREENING FOR CERVICAL LENGTH: ICD-10-CM

## 2022-12-27 RX ORDER — ASPIRIN 81 MG/1
162 TABLET, CHEWABLE ORAL DAILY
Qty: 180 TABLET | Refills: 1 | Status: SHIPPED | OUTPATIENT
Start: 2022-12-27 | End: 2023-03-27

## 2022-12-27 NOTE — PROGRESS NOTES
Ultrasound Probe Disinfection    A transvaginal ultrasound was performed  Prior to use, disinfection was performed with High Level Disinfection Process (Trophon)  Probe serial number [id number]: [id number]  was used        Radha De Luna RDMS  12/27/22  8:04 AM

## 2022-12-27 NOTE — LETTER
December 27, 2022     Zeke Gil MD  [address]  [address] [place] [address]  Chalo Arauz [address]  [id number]    Patient: Diana Aguiar   YOB: 1985   Date of Visit: 12/27/2022       Dear Dr Angela Cortes: Thank you for referring Carlos Abreu to me for evaluation  Below are my notes for this consultation  If you have questions, please do not hesitate to call me  I look forward to following your patient along with you           Sincerely,        Aylin Hewitt MD        CC: No Recipients  Aylin Hewitt MD  12/26/2022 10:42 AM  Sign when Signing Visit  Please refer to the [facility] ultrasound report in Ob Procedures for additional information regarding today's visit

## 2022-12-29 ENCOUNTER — ROUTINE PRENATAL (OUTPATIENT)
Dept: OBGYN CLINIC | Facility: CLINIC | Age: 37
End: 2022-12-29

## 2022-12-29 VITALS
WEIGHT: 239.8 LBS | BODY MASS INDEX: 38.54 KG/M2 | SYSTOLIC BLOOD PRESSURE: 110 MMHG | DIASTOLIC BLOOD PRESSURE: 70 MMHG | HEIGHT: 66 IN

## 2022-12-29 DIAGNOSIS — O09.42 GRAND MULTIPARITY WITH CURRENT PREGNANCY, SECOND TRIMESTER: ICD-10-CM

## 2022-12-29 DIAGNOSIS — O99.212 OBESITY AFFECTING PREGNANCY IN SECOND TRIMESTER: ICD-10-CM

## 2022-12-29 DIAGNOSIS — Z86.32 HISTORY OF GESTATIONAL DIABETES MELLITUS (GDM) IN PRIOR PREGNANCY, CURRENTLY PREGNANT IN SECOND TRIMESTER: ICD-10-CM

## 2022-12-29 DIAGNOSIS — O09.292 HISTORY OF GESTATIONAL DIABETES MELLITUS (GDM) IN PRIOR PREGNANCY, CURRENTLY PREGNANT IN SECOND TRIMESTER: ICD-10-CM

## 2022-12-29 DIAGNOSIS — O09.522 AMA (ADVANCED MATERNAL AGE) MULTIGRAVIDA 35+, SECOND TRIMESTER: Primary | ICD-10-CM

## 2023-01-30 ENCOUNTER — ROUTINE PRENATAL (OUTPATIENT)
Dept: OBGYN CLINIC | Facility: CLINIC | Age: 38
End: 2023-01-30

## 2023-01-30 VITALS
SYSTOLIC BLOOD PRESSURE: 118 MMHG | WEIGHT: 243 LBS | HEIGHT: 66 IN | BODY MASS INDEX: 39.05 KG/M2 | DIASTOLIC BLOOD PRESSURE: 74 MMHG

## 2023-01-30 DIAGNOSIS — O99.212 OBESITY AFFECTING PREGNANCY IN SECOND TRIMESTER: ICD-10-CM

## 2023-01-30 DIAGNOSIS — Z3A.26 26 WEEKS GESTATION OF PREGNANCY: ICD-10-CM

## 2023-01-30 DIAGNOSIS — O09.522 AMA (ADVANCED MATERNAL AGE) MULTIGRAVIDA 35+, SECOND TRIMESTER: Primary | ICD-10-CM

## 2023-01-30 DIAGNOSIS — O09.292 HISTORY OF GESTATIONAL DIABETES MELLITUS (GDM) IN PRIOR PREGNANCY, CURRENTLY PREGNANT IN SECOND TRIMESTER: ICD-10-CM

## 2023-01-30 DIAGNOSIS — O09.42 GRAND MULTIPARITY WITH CURRENT PREGNANCY, SECOND TRIMESTER: ICD-10-CM

## 2023-01-30 DIAGNOSIS — Z86.32 HISTORY OF GESTATIONAL DIABETES MELLITUS (GDM) IN PRIOR PREGNANCY, CURRENTLY PREGNANT IN SECOND TRIMESTER: ICD-10-CM

## 2023-01-30 NOTE — PROGRESS NOTES
Pt is a 40 y o  [id number] 26w5d  Pregnancy is complicated by AMA, [address] multiparity, obesity, h o GDM last pregnancy  Pt reports +FM  Denies vb, lof, ctx  PTL precautions reviewed  3d trimester lab slips given  Has growth scan at 28 weeks  F/u 2 weeks

## 2023-02-14 ENCOUNTER — ULTRASOUND (OUTPATIENT)
Dept: PERINATAL CARE | Facility: OTHER | Age: 38
End: 2023-02-14

## 2023-02-14 VITALS
HEART RATE: 95 BPM | DIASTOLIC BLOOD PRESSURE: 74 MMHG | HEIGHT: 66 IN | SYSTOLIC BLOOD PRESSURE: 122 MMHG | BODY MASS INDEX: 38.99 KG/M2 | WEIGHT: 242.6 LBS

## 2023-02-14 DIAGNOSIS — O09.523 AMA (ADVANCED MATERNAL AGE) MULTIGRAVIDA 35+, THIRD TRIMESTER: Primary | ICD-10-CM

## 2023-02-14 DIAGNOSIS — O99.213 OBESITY AFFECTING PREGNANCY IN THIRD TRIMESTER: ICD-10-CM

## 2023-02-14 DIAGNOSIS — Z3A.28 28 WEEKS GESTATION OF PREGNANCY: ICD-10-CM

## 2023-02-14 NOTE — PROGRESS NOTES
The patient was seen today for an ultrasound  Please see ultrasound report (located under Ob Procedures) for additional details  Thank you very much for allowing us to participate in the care of this very nice patient  Should you have any questions, please do not hesitate to contact me  Himanshu Chacko MD [id number] [facility]  Attending Physician, Noel

## 2023-02-20 ENCOUNTER — APPOINTMENT (OUTPATIENT)
Dept: LAB | Age: 38
End: 2023-02-20

## 2023-02-20 DIAGNOSIS — O09.42 GRAND MULTIPARITY WITH CURRENT PREGNANCY, SECOND TRIMESTER: ICD-10-CM

## 2023-02-20 DIAGNOSIS — O09.292 HISTORY OF GESTATIONAL DIABETES MELLITUS (GDM) IN PRIOR PREGNANCY, CURRENTLY PREGNANT IN SECOND TRIMESTER: ICD-10-CM

## 2023-02-20 DIAGNOSIS — Z3A.26 26 WEEKS GESTATION OF PREGNANCY: ICD-10-CM

## 2023-02-20 DIAGNOSIS — Z86.32 HISTORY OF GESTATIONAL DIABETES MELLITUS (GDM) IN PRIOR PREGNANCY, CURRENTLY PREGNANT IN SECOND TRIMESTER: ICD-10-CM

## 2023-02-20 DIAGNOSIS — O99.810 ABNORMAL GLUCOSE TOLERANCE TEST (GTT) DURING PREGNANCY, ANTEPARTUM: Primary | ICD-10-CM

## 2023-02-20 DIAGNOSIS — O99.212 OBESITY AFFECTING PREGNANCY IN SECOND TRIMESTER: ICD-10-CM

## 2023-02-20 DIAGNOSIS — O09.522 AMA (ADVANCED MATERNAL AGE) MULTIGRAVIDA 35+, SECOND TRIMESTER: ICD-10-CM

## 2023-02-20 PROBLEM — O99.013 ANEMIA, ANTEPARTUM, THIRD TRIMESTER: Status: ACTIVE | Noted: 2023-02-20

## 2023-02-20 LAB
ERYTHROCYTE [DISTWIDTH] IN BLOOD BY AUTOMATED COUNT: 12.8 % (ref 11.6–15.1)
GLUCOSE 1H P 50 G GLC PO SERPL-MCNC: 152 MG/DL (ref 40–134)
HCT VFR BLD AUTO: 29.3 % (ref 34.8–46.1)
HGB BLD-MCNC: 9.5 G/DL (ref 11.5–15.4)
MCH RBC QN AUTO: 30.7 PG (ref 26.8–34.3)
MCHC RBC AUTO-ENTMCNC: 32.4 G/DL (ref 31.4–37.4)
MCV RBC AUTO: 95 FL (ref 82–98)
PLATELET # BLD AUTO: 192 THOUSANDS/UL (ref 149–390)
PMV BLD AUTO: 12.5 FL (ref 8.9–12.7)
RBC # BLD AUTO: 3.09 MILLION/UL (ref 3.81–5.12)
WBC # BLD AUTO: 8.05 THOUSAND/UL (ref 4.31–10.16)

## 2023-02-21 LAB — TREPONEMA PALLIDUM IGG+IGM AB [PRESENCE] IN SERUM OR PLASMA BY IMMUNOASSAY: NORMAL

## 2023-03-01 ENCOUNTER — APPOINTMENT (OUTPATIENT)
Dept: LAB | Facility: HOSPITAL | Age: 38
End: 2023-03-01

## 2023-03-01 ENCOUNTER — TELEPHONE (OUTPATIENT)
Dept: OBGYN CLINIC | Facility: CLINIC | Age: 38
End: 2023-03-01

## 2023-03-01 DIAGNOSIS — O09.292 HISTORY OF GESTATIONAL DIABETES MELLITUS (GDM) IN PRIOR PREGNANCY, CURRENTLY PREGNANT IN SECOND TRIMESTER: ICD-10-CM

## 2023-03-01 DIAGNOSIS — O99.810 ABNORMAL GLUCOSE TOLERANCE TEST (GTT) DURING PREGNANCY, ANTEPARTUM: ICD-10-CM

## 2023-03-01 DIAGNOSIS — Z86.32 HISTORY OF GESTATIONAL DIABETES MELLITUS (GDM) IN PRIOR PREGNANCY, CURRENTLY PREGNANT IN SECOND TRIMESTER: ICD-10-CM

## 2023-03-01 LAB
GLUCOSE 1H P 100 G GLC PO SERPL-MCNC: 173 MG/DL (ref 70–183)
GLUCOSE 2H P 100 G GLC PO SERPL-MCNC: 181 MG/DL (ref 70–155)
GLUCOSE 2H P 75 G GLC PO SERPL-MCNC: 181 MG/DL
GLUCOSE 3H P 100 G GLC PO SERPL-MCNC: 133 MG/DL (ref 70–140)
GLUCOSE P FAST SERPL-MCNC: 88 MG/DL (ref 65–94)

## 2023-03-01 NOTE — TELEPHONE ENCOUNTER
The patient called stating she received her 3 hr glucose results and would like to speak to the nurse regarding the results  Please advise

## 2023-03-02 ENCOUNTER — ROUTINE PRENATAL (OUTPATIENT)
Dept: OBGYN CLINIC | Facility: CLINIC | Age: 38
End: 2023-03-02

## 2023-03-02 VITALS
DIASTOLIC BLOOD PRESSURE: 72 MMHG | WEIGHT: 242.4 LBS | SYSTOLIC BLOOD PRESSURE: 124 MMHG | HEIGHT: 66 IN | BODY MASS INDEX: 38.96 KG/M2 | OXYGEN SATURATION: 99 %

## 2023-03-02 DIAGNOSIS — O09.293 HISTORY OF GESTATIONAL DIABETES MELLITUS (GDM) IN PRIOR PREGNANCY, CURRENTLY PREGNANT IN THIRD TRIMESTER: ICD-10-CM

## 2023-03-02 DIAGNOSIS — Z86.32 HISTORY OF GESTATIONAL DIABETES MELLITUS (GDM) IN PRIOR PREGNANCY, CURRENTLY PREGNANT IN THIRD TRIMESTER: ICD-10-CM

## 2023-03-02 DIAGNOSIS — O99.213 OBESITY AFFECTING PREGNANCY IN THIRD TRIMESTER: Primary | ICD-10-CM

## 2023-03-02 DIAGNOSIS — Z3A.31 31 WEEKS GESTATION OF PREGNANCY: ICD-10-CM

## 2023-03-02 DIAGNOSIS — O99.810 ABNORMAL GLUCOSE TOLERANCE TEST (GTT) DURING PREGNANCY, ANTEPARTUM: ICD-10-CM

## 2023-03-02 DIAGNOSIS — O09.523 AMA (ADVANCED MATERNAL AGE) MULTIGRAVIDA 35+, THIRD TRIMESTER: ICD-10-CM

## 2023-03-02 PROBLEM — O09.43: Status: ACTIVE | Noted: 2022-12-01

## 2023-03-02 NOTE — PROGRESS NOTES
Pt is a 40 y o  [id number] 31w1d  Pregnancy is complicated by AMA, [address] multiparity, obesity, h o GDM last pregnancy, elevated 1 hour gtt  Reviewed with patient that her 3 hour gtt only had 1 abnormality and she does not have GDM  She does though, have anemia with hgb 9 5  Advised she start iron  If no improvement in 4 weeks, will start IV iron  Pt reports +FM  Denies vb, lof, ctx  PTL precautions and fkc reviewed  She stopped taking her ASA, pt advised against this  Declines TDAP  3d trimester folder reviewed and given  Delivery consent reviewed and signed  28 weeks u/s shows EFW 28%  F/u 2 weeks

## 2023-03-16 ENCOUNTER — ROUTINE PRENATAL (OUTPATIENT)
Dept: OBGYN CLINIC | Facility: CLINIC | Age: 38
End: 2023-03-16

## 2023-03-16 VITALS
SYSTOLIC BLOOD PRESSURE: 118 MMHG | DIASTOLIC BLOOD PRESSURE: 72 MMHG | WEIGHT: 241.8 LBS | HEIGHT: 66 IN | HEART RATE: 91 BPM | BODY MASS INDEX: 38.86 KG/M2

## 2023-03-16 DIAGNOSIS — O09.523 AMA (ADVANCED MATERNAL AGE) MULTIGRAVIDA 35+, THIRD TRIMESTER: Primary | ICD-10-CM

## 2023-03-16 DIAGNOSIS — O09.43 GRAND MULTIPARITY WITH CURRENT PREGNANCY, ANTEPARTUM, THIRD TRIMESTER: ICD-10-CM

## 2023-03-16 DIAGNOSIS — O99.213 OBESITY AFFECTING PREGNANCY IN THIRD TRIMESTER: ICD-10-CM

## 2023-03-16 DIAGNOSIS — O99.013 ANEMIA AFFECTING PREGNANCY IN THIRD TRIMESTER: ICD-10-CM

## 2023-03-16 NOTE — PROGRESS NOTES
Pt is a 40 y o  [id number] 33w1d gestation  Pregnancy is complicated by AMA, [address] multiparity, obesity, h o GDM last pregnancy; ^ 1hr   GTT w/ normal 3h GTT  Not taking ASA, states that she discussed pros/cons with Dr Vibha Richardson  She has been taking an iron supplement as recommended by Dr Vibha Richardson  CBC order provided for recheck early 4/2023, prn IV iron  Has f/u US with MFM on 3/31/23  Baby is active, denies leakage of fluid, vaginal bleeding or uterine contractions    S=D, normal FHTs, normal BP    declined TDAP/ consent signed  RV 2w  Pt desires to take placenta home

## 2023-04-03 ENCOUNTER — ROUTINE PRENATAL (OUTPATIENT)
Dept: OBGYN CLINIC | Facility: CLINIC | Age: 38
End: 2023-04-03

## 2023-04-03 ENCOUNTER — LAB (OUTPATIENT)
Dept: LAB | Age: 38
End: 2023-04-03

## 2023-04-03 VITALS
DIASTOLIC BLOOD PRESSURE: 82 MMHG | SYSTOLIC BLOOD PRESSURE: 124 MMHG | WEIGHT: 244.8 LBS | HEIGHT: 66 IN | BODY MASS INDEX: 39.34 KG/M2

## 2023-04-03 DIAGNOSIS — Z86.32 HISTORY OF GESTATIONAL DIABETES MELLITUS (GDM) IN PRIOR PREGNANCY, CURRENTLY PREGNANT IN THIRD TRIMESTER: ICD-10-CM

## 2023-04-03 DIAGNOSIS — O99.810 ABNORMAL GLUCOSE TOLERANCE TEST (GTT) DURING PREGNANCY, ANTEPARTUM: ICD-10-CM

## 2023-04-03 DIAGNOSIS — O09.293 HISTORY OF GESTATIONAL DIABETES MELLITUS (GDM) IN PRIOR PREGNANCY, CURRENTLY PREGNANT IN THIRD TRIMESTER: ICD-10-CM

## 2023-04-03 DIAGNOSIS — O09.43 GRAND MULTIPARITY WITH CURRENT PREGNANCY, THIRD TRIMESTER: ICD-10-CM

## 2023-04-03 DIAGNOSIS — O99.213 OBESITY AFFECTING PREGNANCY IN THIRD TRIMESTER: ICD-10-CM

## 2023-04-03 DIAGNOSIS — O09.523 AMA (ADVANCED MATERNAL AGE) MULTIGRAVIDA 35+, THIRD TRIMESTER: Primary | ICD-10-CM

## 2023-04-03 DIAGNOSIS — O99.013 ANEMIA AFFECTING PREGNANCY IN THIRD TRIMESTER: ICD-10-CM

## 2023-04-03 DIAGNOSIS — O99.013 ANEMIA, ANTEPARTUM, THIRD TRIMESTER: ICD-10-CM

## 2023-04-03 PROBLEM — Z3A.35 35 WEEKS GESTATION OF PREGNANCY: Status: ACTIVE | Noted: 2023-01-30

## 2023-04-03 LAB
ERYTHROCYTE [DISTWIDTH] IN BLOOD BY AUTOMATED COUNT: 13.4 % (ref 11.6–15.1)
HCT VFR BLD AUTO: 31.7 % (ref 34.8–46.1)
HGB BLD-MCNC: 10.4 G/DL (ref 11.5–15.4)
MCH RBC QN AUTO: 30.7 PG (ref 26.8–34.3)
MCHC RBC AUTO-ENTMCNC: 32.8 G/DL (ref 31.4–37.4)
MCV RBC AUTO: 94 FL (ref 82–98)
PLATELET # BLD AUTO: 182 THOUSANDS/UL (ref 149–390)
PMV BLD AUTO: 13.2 FL (ref 8.9–12.7)
RBC # BLD AUTO: 3.39 MILLION/UL (ref 3.81–5.12)
WBC # BLD AUTO: 8.21 THOUSAND/UL (ref 4.31–10.16)

## 2023-04-03 NOTE — PROGRESS NOTES
Pt is a 40 y o  [id number] 35w5d  Pregnancy is complicated by AMA, [address] multiparity, obesity, h o GDM last pregnancy; ^ 1hr   GTT w/ normal 3h GTT (3/4 values)  Pt reports +FM  Denies vb, lof  Notes irregular ctx and pelvic pressure  Labor precautions and fkc reviewed  GBS collected  SVE: 1/20/-3, soft, posterior  Birth plan reviewed and signed

## 2023-04-06 LAB — GP B STREP DNA SPEC QL NAA+PROBE: NEGATIVE

## 2023-04-24 ENCOUNTER — ROUTINE PRENATAL (OUTPATIENT)
Dept: OBGYN CLINIC | Facility: CLINIC | Age: 38
End: 2023-04-24

## 2023-04-24 VITALS
BODY MASS INDEX: 39.05 KG/M2 | HEIGHT: 66 IN | DIASTOLIC BLOOD PRESSURE: 80 MMHG | WEIGHT: 243 LBS | SYSTOLIC BLOOD PRESSURE: 120 MMHG

## 2023-04-24 DIAGNOSIS — O99.013 ANEMIA, ANTEPARTUM, THIRD TRIMESTER: ICD-10-CM

## 2023-04-24 DIAGNOSIS — Z3A.38 38 WEEKS GESTATION OF PREGNANCY: ICD-10-CM

## 2023-04-24 DIAGNOSIS — O09.293 HISTORY OF GESTATIONAL DIABETES MELLITUS (GDM) IN PRIOR PREGNANCY, CURRENTLY PREGNANT IN THIRD TRIMESTER: ICD-10-CM

## 2023-04-24 DIAGNOSIS — O09.43 GRAND MULTIPARITY WITH CURRENT PREGNANCY, ANTEPARTUM, THIRD TRIMESTER: ICD-10-CM

## 2023-04-24 DIAGNOSIS — O09.43 GRAND MULTIPARITY WITH CURRENT PREGNANCY, THIRD TRIMESTER: ICD-10-CM

## 2023-04-24 DIAGNOSIS — O99.810 ABNORMAL GLUCOSE TOLERANCE TEST (GTT) DURING PREGNANCY, ANTEPARTUM: ICD-10-CM

## 2023-04-24 DIAGNOSIS — O99.013 ANEMIA AFFECTING PREGNANCY IN THIRD TRIMESTER: ICD-10-CM

## 2023-04-24 DIAGNOSIS — O09.523 AMA (ADVANCED MATERNAL AGE) MULTIGRAVIDA 35+, THIRD TRIMESTER: Primary | ICD-10-CM

## 2023-04-24 DIAGNOSIS — O99.213 OBESITY AFFECTING PREGNANCY IN THIRD TRIMESTER: ICD-10-CM

## 2023-04-24 DIAGNOSIS — Z86.32 HISTORY OF GESTATIONAL DIABETES MELLITUS (GDM) IN PRIOR PREGNANCY, CURRENTLY PREGNANT IN THIRD TRIMESTER: ICD-10-CM

## 2023-04-24 NOTE — PROGRESS NOTES
Pt is a 40 y o  [id number] 38w5d  Pregnancy is complicated by AMA, [address] multiparity, obesity, h o GDM last pregnancy; ^ 1hr GTT w/ normal 3h GTT  Pt reports +FM  Denies vb, lof  Notes irregular ctx  Labor precautions and fkc reviewed  SVE: 1 5/50/-3, soft, posterior  Birth plan reviewed and signed  IOL on 5/7 if no spontaneous labor   IOL consent reviewed and signed

## 2023-05-01 ENCOUNTER — ROUTINE PRENATAL (OUTPATIENT)
Dept: OBGYN CLINIC | Facility: CLINIC | Age: 38
End: 2023-05-01

## 2023-05-01 VITALS
HEIGHT: 66 IN | BODY MASS INDEX: 39.21 KG/M2 | WEIGHT: 244 LBS | DIASTOLIC BLOOD PRESSURE: 76 MMHG | SYSTOLIC BLOOD PRESSURE: 112 MMHG

## 2023-05-01 DIAGNOSIS — Z86.32 HISTORY OF GESTATIONAL DIABETES MELLITUS (GDM) IN PRIOR PREGNANCY, CURRENTLY PREGNANT IN THIRD TRIMESTER: ICD-10-CM

## 2023-05-01 DIAGNOSIS — O99.213 OBESITY AFFECTING PREGNANCY IN THIRD TRIMESTER: ICD-10-CM

## 2023-05-01 DIAGNOSIS — O09.43 GRAND MULTIPARITY WITH CURRENT PREGNANCY, THIRD TRIMESTER: ICD-10-CM

## 2023-05-01 DIAGNOSIS — O09.43 GRAND MULTIPARITY WITH CURRENT PREGNANCY, ANTEPARTUM, THIRD TRIMESTER: ICD-10-CM

## 2023-05-01 DIAGNOSIS — O99.013 ANEMIA, ANTEPARTUM, THIRD TRIMESTER: ICD-10-CM

## 2023-05-01 DIAGNOSIS — O09.293 HISTORY OF GESTATIONAL DIABETES MELLITUS (GDM) IN PRIOR PREGNANCY, CURRENTLY PREGNANT IN THIRD TRIMESTER: ICD-10-CM

## 2023-05-01 DIAGNOSIS — O09.523 AMA (ADVANCED MATERNAL AGE) MULTIGRAVIDA 35+, THIRD TRIMESTER: Primary | ICD-10-CM

## 2023-05-01 DIAGNOSIS — Z3A.39 39 WEEKS GESTATION OF PREGNANCY: ICD-10-CM

## 2023-05-01 DIAGNOSIS — O99.013 ANEMIA AFFECTING PREGNANCY IN THIRD TRIMESTER: ICD-10-CM

## 2023-05-01 NOTE — PROGRESS NOTES
Pt is a 40 y o  [id number] 39w5d  Pregnancy is complicated by AMA, [address] multiparity, obesity, h o GDM last pregnancy; ^ 1hr GTT w/ normal 3h GTT  Pt reports +FM  Denies vb, lof  Notes irregular ctx   Labor precautions and fkc reviewed  IOL on 5/7 if no spontaneous labor  Will plan on cytotec

## 2023-05-07 ENCOUNTER — HOSPITAL ENCOUNTER (OUTPATIENT)
Dept: LABOR AND DELIVERY | Facility: HOSPITAL | Age: 38
Discharge: HOME | End: 2023-05-07

## 2023-05-07 ENCOUNTER — HOSPITAL ENCOUNTER (INPATIENT)
Facility: HOSPITAL | Age: 38
LOS: 1 days | Discharge: HOME | End: 2023-05-08
Attending: OBSTETRICS & GYNECOLOGY | Admitting: OBSTETRICS & GYNECOLOGY

## 2023-05-07 DIAGNOSIS — Z3A.40 40 WEEKS GESTATION OF PREGNANCY: Primary | ICD-10-CM

## 2023-05-07 LAB
ABO GROUP BLD: NORMAL
ALBUMIN SERPL BCP-MCNC: 3.7 G/DL (ref 3.5–5)
ALP SERPL-CCNC: 136 U/L (ref 34–104)
ALT SERPL W P-5'-P-CCNC: 41 U/L (ref 7–52)
ANION GAP SERPL CALCULATED.3IONS-SCNC: 7 MMOL/L (ref 4–13)
AST SERPL W P-5'-P-CCNC: 46 U/L (ref 13–39)
BASE EXCESS BLDCOA CALC-SCNC: -2.3 MMOL/L (ref 3–11)
BASE EXCESS BLDCOV CALC-SCNC: -5.2 MMOL/L (ref 1–9)
BILIRUB SERPL-MCNC: 0.45 MG/DL (ref 0.2–1)
BLD GP AB SCN SERPL QL: NEGATIVE
BUN SERPL-MCNC: 6 MG/DL (ref 5–25)
CALCIUM SERPL-MCNC: 8.9 MG/DL (ref 8.4–10.2)
CHLORIDE SERPL-SCNC: 104 MMOL/L (ref 96–108)
CO2 SERPL-SCNC: 20 MMOL/L (ref 21–32)
CREAT SERPL-MCNC: 0.49 MG/DL (ref 0.6–1.3)
CREAT UR-MCNC: 73.8 MG/DL
ERYTHROCYTE [DISTWIDTH] IN BLOOD BY AUTOMATED COUNT: 13.6 % (ref 11.6–15.1)
GFR SERPL CREATININE-BSD FRML MDRD: 125 ML/MIN/1.73SQ M
GLUCOSE SERPL-MCNC: 75 MG/DL (ref 65–140)
HCO3 BLDCOA-SCNC: 25.3 MMOL/L (ref 17.3–27.3)
HCO3 BLDCOV-SCNC: 20 MMOL/L (ref 12.2–28.6)
HCT VFR BLD AUTO: 32.9 % (ref 34.8–46.1)
HGB BLD-MCNC: 10.9 G/DL (ref 11.5–15.4)
HOLD SPECIMEN: NORMAL
MCH RBC QN AUTO: 31 PG (ref 26.8–34.3)
MCHC RBC AUTO-ENTMCNC: 33.1 G/DL (ref 31.4–37.4)
MCV RBC AUTO: 94 FL (ref 82–98)
O2 CT VFR BLDCOA CALC: 6.7 ML/DL
OXYHGB MFR BLDCOA: 27.5 %
OXYHGB MFR BLDCOV: 70.7 %
PCO2 BLDCOA: 54 MM[HG] (ref 30–60)
PCO2 BLDCOV: 38 MM HG (ref 27–43)
PH BLDCOA: 7.29 [PH] (ref 7.23–7.43)
PH BLDCOV: 7.34 [PH] (ref 7.19–7.49)
PLATELET # BLD AUTO: 162 THOUSANDS/UL (ref 149–390)
PMV BLD AUTO: 13.4 FL (ref 8.9–12.7)
PO2 BLDCOA: 17 MM HG (ref 5–25)
PO2 BLDCOV: 34.2 MM HG (ref 15–45)
POTASSIUM SERPL-SCNC: 3.8 MMOL/L (ref 3.5–5.3)
PROT SERPL-MCNC: 6.9 G/DL (ref 6.4–8.4)
PROT UR-MCNC: 15 MG/DL
PROT/CREAT UR: 0.2 MG/G{CREAT} (ref 0–0.1)
RBC # BLD AUTO: 3.52 MILLION/UL (ref 3.81–5.12)
RH BLD: POSITIVE
SAO2 % BLDCOV: 16.6 ML/DL
SODIUM SERPL-SCNC: 131 MMOL/L (ref 135–147)
SPECIMEN EXPIRATION DATE: NORMAL
WBC # BLD AUTO: 9.82 THOUSAND/UL (ref 4.31–10.16)

## 2023-05-07 PROCEDURE — 4A1HXCZ MONITORING OF PRODUCTS OF CONCEPTION, CARDIAC RATE, EXTERNAL APPROACH: ICD-10-PCS | Performed by: OBSTETRICS & GYNECOLOGY

## 2023-05-07 PROCEDURE — 10907ZC DRAINAGE OF AMNIOTIC FLUID, THERAPEUTIC FROM PRODUCTS OF CONCEPTION, VIA NATURAL OR ARTIFICIAL OPENING: ICD-10-PCS | Performed by: OBSTETRICS & GYNECOLOGY

## 2023-05-07 RX ORDER — IBUPROFEN 600 MG/1
600 TABLET ORAL EVERY 6 HOURS PRN
Status: DISCONTINUED | OUTPATIENT
Start: 2023-05-07 | End: 2023-05-08 | Stop reason: HOSPADM

## 2023-05-07 RX ORDER — SODIUM CHLORIDE, SODIUM LACTATE, POTASSIUM CHLORIDE, CALCIUM CHLORIDE 600; 310; 30; 20 MG/100ML; MG/100ML; MG/100ML; MG/100ML
125 INJECTION, SOLUTION INTRAVENOUS CONTINUOUS
Status: DISCONTINUED | OUTPATIENT
Start: 2023-05-07 | End: 2023-05-07

## 2023-05-07 RX ORDER — CALCIUM CARBONATE 200(500)MG
1000 TABLET,CHEWABLE ORAL 3 TIMES DAILY PRN
Status: DISCONTINUED | OUTPATIENT
Start: 2023-05-07 | End: 2023-05-08 | Stop reason: HOSPADM

## 2023-05-07 RX ORDER — DIAPER,BRIEF,INFANT-TODD,DISP
1 EACH MISCELLANEOUS DAILY PRN
Status: DISCONTINUED | OUTPATIENT
Start: 2023-05-07 | End: 2023-05-08 | Stop reason: HOSPADM

## 2023-05-07 RX ORDER — DOCUSATE SODIUM 100 MG/1
100 CAPSULE, LIQUID FILLED ORAL 2 TIMES DAILY
Status: DISCONTINUED | OUTPATIENT
Start: 2023-05-07 | End: 2023-05-08 | Stop reason: HOSPADM

## 2023-05-07 RX ORDER — ONDANSETRON 2 MG/ML
4 INJECTION INTRAMUSCULAR; INTRAVENOUS EVERY 6 HOURS PRN
Status: DISCONTINUED | OUTPATIENT
Start: 2023-05-07 | End: 2023-05-07

## 2023-05-07 RX ORDER — BUPIVACAINE HYDROCHLORIDE 2.5 MG/ML
30 INJECTION, SOLUTION EPIDURAL; INFILTRATION; INTRACAUDAL ONCE AS NEEDED
Status: DISCONTINUED | OUTPATIENT
Start: 2023-05-07 | End: 2023-05-07

## 2023-05-07 RX ORDER — SIMETHICONE 80 MG
80 TABLET,CHEWABLE ORAL 4 TIMES DAILY PRN
Status: DISCONTINUED | OUTPATIENT
Start: 2023-05-07 | End: 2023-05-08 | Stop reason: HOSPADM

## 2023-05-07 RX ORDER — OXYTOCIN/RINGER'S LACTATE 30/500 ML
1-30 PLASTIC BAG, INJECTION (ML) INTRAVENOUS
Status: DISCONTINUED | OUTPATIENT
Start: 2023-05-07 | End: 2023-05-07

## 2023-05-07 RX ORDER — ONDANSETRON 2 MG/ML
4 INJECTION INTRAMUSCULAR; INTRAVENOUS EVERY 8 HOURS PRN
Status: DISCONTINUED | OUTPATIENT
Start: 2023-05-07 | End: 2023-05-08 | Stop reason: HOSPADM

## 2023-05-07 RX ORDER — OXYTOCIN/RINGER'S LACTATE 30/500 ML
250 PLASTIC BAG, INJECTION (ML) INTRAVENOUS CONTINUOUS
Status: ACTIVE | OUTPATIENT
Start: 2023-05-07 | End: 2023-05-07

## 2023-05-07 RX ORDER — ACETAMINOPHEN 325 MG/1
650 TABLET ORAL EVERY 4 HOURS PRN
Status: DISCONTINUED | OUTPATIENT
Start: 2023-05-07 | End: 2023-05-08 | Stop reason: HOSPADM

## 2023-05-07 RX ADMIN — OXYTOCIN 2 MILLI-UNITS/MIN: 10 INJECTION INTRAVENOUS at 14:28

## 2023-05-07 RX ADMIN — SODIUM CHLORIDE, SODIUM LACTATE, POTASSIUM CHLORIDE, AND CALCIUM CHLORIDE 125 ML/HR: .6; .31; .03; .02 INJECTION, SOLUTION INTRAVENOUS at 08:15

## 2023-05-07 RX ADMIN — IBUPROFEN 600 MG: 600 TABLET, FILM COATED ORAL at 18:32

## 2023-05-07 RX ADMIN — WITCH HAZEL 1 PAD.: 500 SOLUTION RECTAL; TOPICAL at 19:40

## 2023-05-07 RX ADMIN — OXYTOCIN 250 MILLI-UNITS/MIN: 10 INJECTION INTRAVENOUS at 16:55

## 2023-05-07 RX ADMIN — HYDROCORTISONE 1 APPLICATION.: 1 CREAM TOPICAL at 19:40

## 2023-05-07 RX ADMIN — Medication 25 MCG: at 09:50

## 2023-05-07 RX ADMIN — BENZOCAINE AND LEVOMENTHOL 1 APPLICATION.: 200; 5 SPRAY TOPICAL at 19:40

## 2023-05-07 RX ADMIN — DOCUSATE SODIUM 100 MG: 100 CAPSULE, LIQUID FILLED ORAL at 18:32

## 2023-05-07 NOTE — OB LABOR/OXYTOCIN SAFETY PROGRESS
Oxytocin Safety Progress Check Note - Jamal Robertson 40 y o  female MRN: [id number]    Unit/Bed#: L&D 324-01 Encounter: [id number]    Dose (marya-units/min) Oxytocin: 2 marya-units/min  Contraction Frequency (minutes): 2-2 5  Contraction Quality: Moderate  Tachysystole: No   Cervical Dilation: 10  Dilation Complete Date: 23  Dilation Complete Time: 1658  Cervical Effacement: 100  Fetal Station: 2  Baseline Rate: 130 bpm  Fetal Heart Rate: 125 BPM  FHR Category: Category I               Vital Signs:   Vitals:    23 1637   BP: 152/72   Pulse: 69   Resp:    Temp:        Notes/comments:   Pt with urge to push and unable to resist  Pt 10/100/+2   Anticipate         Gabby Caballero MD 2023 5:06 PM

## 2023-05-07 NOTE — OB LABOR/OXYTOCIN SAFETY PROGRESS
Oxytocin Safety Progress Check Note - Indu Ricky 40 y o  female MRN: [id number]    Unit/Bed#: L&D 324-01 Encounter: [id number]    Dose (marya-units/min) Oxytocin: 2 marya-units/min  Contraction Frequency (minutes): 3  Contraction Quality: Mild, Moderate  Tachysystole: No   Cervical Dilation: 5        Cervical Effacement: 60  Fetal Station: -2  Baseline Rate: 135 bpm  Fetal Heart Rate: 125 BPM  FHR Category: Category I               Vital Signs:   Vitals:    05/07/23 1553   BP: 142/87   Pulse: 73   Resp:    Temp:        Notes/comments: ]  Pt notes intermittent pelvic pressure with contractions  AROM for clear fluid  Continue odalis Dhillon MD 5/7/2023 4:08 PM

## 2023-05-07 NOTE — H&P
H&P Exam - Obstetrics   Amparo Mathews 40 y o  female MRN: [id number]  Unit/Bed#: L&D 324-01 Encounter: [id number]      ASSESSMENT:  39 yo [address] at 40w4d weeks gestation who is being admitted for eIOL  EFW: 2995 grams - 6 lbs 10 oz  (47%) on 23  VTX by SVE    PLAN:    * 40 weeks gestation of pregnancy  Assessment & Plan  Admit  Follow up CBC, RPR, Blood Type  Start with ramirez balloon and cytotec  Method of contraception: none  GBS status: neg   Analgesia and/or epidural at patient request  Anticipate       Anemia affecting pregnancy in third trimester  Assessment & Plan  Admission Hgb 10 9    Obesity affecting pregnancy in third trimester  Assessment & Plan  BMI 39      Discussed with Dr Zeenat Doss    This patient will be an INPATIENT  and I certify the anticipated length of stay is >2 Midnights  History of Present Illness     Chief Complaint: Induction of labor    HPI:  Amparo Mathews is a 40 y o  [id number] female with an VASILE of 5/3/2023, by Ultrasound at 40w4d weeks gestation who is being admitted for eIOL  She has no current complaints  Contractions: yes  Loss of fluid: no  Vaginal bleeding: no  Fetal movement: yes    She is a Dr Zeenat Doss patient  PREGNANCY COMPLICATIONS:   1) As above    OB History    Para Term  AB Living   7 4 4 0 2 4   SAB IAB Ectopic Multiple Live Births   1 0 1 0 4      # Outcome Date GA Lbr Cesar/2nd Weight Sex Delivery Anes PTL Lv   7 Current            6 Term 2020 40w6d  3921 g (8 lb 10 3 oz) M Vag-Spont  N EDER   5 Term 2019 39w1d  3487 g (7 lb 11 oz) M Vag-Spont  N EDRE   4 Ectopic 2017              Birth Comments: ? chemical pregnancy   3 Term 2011 40w0d  3751 g (8 lb 4 3 oz) F Vag-Spont  N EDER   2 Term 10/2009 40w0d  2892 g (6 lb 6 oz) M   N EDER   1 SAB      SAB         Birth Comments: D&C       Baby complications/comments: none    Review of Systems   Constitutional: Negative for chills and fever  Eyes: Negative for visual disturbance  "  Respiratory: Negative for chest tightness and shortness of breath  Cardiovascular: Negative for chest pain and palpitations  Gastrointestinal: Negative for abdominal pain  Genitourinary: Negative for vaginal bleeding, vaginal discharge and vaginal pain  Neurological: Negative for headaches  Historical Information   Past Medical History:   Diagnosis Date   • Abnormal Pap smear of cervix     2020, ASCUS/ neg HPV   • Chlamydia infection 10/2013   • Ectopic pregnancy    • Gestational diabetes    • H/O hernia repair 10/2015   • History of cholecystectomy 05/2015   • History of gestational diabetes 12/2018    insulin dependent   • Miscarriage      Past Surgical History:   Procedure Laterality Date   • CHOLECYSTECTOMY     • DILATION AND CURETTAGE OF UTERUS  2008     Social History   Social History     Substance and Sexual Activity   Alcohol Use Never     Social History     Substance and Sexual Activity   Drug Use Never     Social History     Tobacco Use   Smoking Status Never   Smokeless Tobacco Never     Family History: non-contributory    Meds/Allergies      Medications Prior to Admission   Medication   • Prenatal Vit-Fe Fumarate-FA (PRENATAL 1+1 PO)        Allergies   Allergen Reactions   • Sesame Seed (Diagnostic) - Food Allergy Anaphylaxis       OBJECTIVE:    Vitals: Blood pressure 141/63, pulse 81, temperature 97 9 °F (36 6 °C), temperature source Oral, resp  rate 18, height 5' 6\" (1 676 m), weight 111 kg (244 lb)  Body mass index is 39 38 kg/m²  Physical Exam  HENT:      Head: Normocephalic  Eyes:      Conjunctiva/sclera: Conjunctivae normal    Cardiovascular:      Rate and Rhythm: Normal rate  Pulses: Normal pulses  Pulmonary:      Effort: Pulmonary effort is normal    Abdominal:      Palpations: Abdomen is soft  Tenderness: There is no abdominal tenderness  Skin:     General: Skin is warm  Neurological:      Mental Status: She is alert and oriented to person, place, and time   " Psychiatric:         Mood and Affect: Mood normal        Cervix:  2 5/0/-3    Fetal heart rate:   Baseline Rate: 135 bpm  Variability: Moderate 6-25 bpm  Accelerations: 15 x 15 or greater  Decelerations: None    [place]:   Contraction Frequency (minutes): 4-6  Contraction Duration (seconds):   Contraction Quality: Mild    Prenatal Labs:   Blood Type:   Lab Results   Component Value Date/Time    ABO Grouping B 05/07/2023 08:14 AM     , D (Rh type):   Lab Results   Component Value Date/Time    Rh Factor Positive 05/07/2023 08:14 AM     , Antibody Screen: negative  , HCT/HGB:   Lab Results   Component Value Date/Time    Hematocrit 32 9 (L) 05/07/2023 08:14 AM    Hemoglobin 10 9 (L) 05/07/2023 08:14 AM      , MCV:   Lab Results   Component Value Date/Time    MCV 94 05/07/2023 08:14 AM      , Platelets:   Lab Results   Component Value Date/Time    Platelets 729 31/14/4371 08:14 AM      , 1 hour Glucola:   Lab Results   Component Value Date/Time    Glucose 152 (H) 02/20/2023 01:45 PM   , 3 hour GTT:   Lab Results   Component Value Date/Time    Glucose, GTT - 3 Hour 133 03/01/2023 01:37 PM   , Varicella:   Lab Results   Component Value Date/Time    Varicella IgG IMMUNE 10/22/2022 11:42 AM       , Rubella:   Lab Results   Component Value Date/Time    Rubella IgG Quant 66 1 10/22/2022 11:42 AM        , VDRL/RPR:   Lab Results   Component Value Date/Time    RPR Non-Reactive 10/22/2022 11:42 AM      , Hep B:   Lab Results   Component Value Date/Time    Hepatitis B Surface Ag Non-reactive 10/22/2022 11:42 AM     , Hep C: non-reactive    , HIV:   Lab Results   Component Value Date/Time    HIV-1/HIV-2 Ab Non-Reactive 10/22/2022 11:42 AM     , Chlamydia:   Lab Results   Component Value Date/Time    External Chlamydia Screen negative 10/25/2022 12:00 AM     , Gonorrhea: negative    , Group B Strep:    Lab Results   Component Value Date/Time    Strep Grp B PCR Negative 04/03/2023 10:36 AM          Invasive Devices Peripheral Intravenous Line  Duration           Peripheral IV 05/07/23 Dorsal (posterior); Right Hand <1 day                Jered Mcallister MD  PGY-2  5/7/2023  10:47 AM

## 2023-05-07 NOTE — OB LABOR/OXYTOCIN SAFETY PROGRESS
Oxytocin Safety Progress Check Note - Jamal Robertson 40 y o  female MRN: [id number]    Unit/Bed#: L&D 324-01 Encounter: [id number]    Dose (marya-units/min) Oxytocin: 2 marya-units/min  Contraction Frequency (minutes): 1 5-3  Contraction Quality: Moderate  Tachysystole: No   Cervical Dilation: 6-7        Cervical Effacement: 90  Fetal Station: -1  Baseline Rate: 135 bpm  Fetal Heart Rate: 125 BPM  FHR Category: Category I               Vital Signs:   Vitals:    05/07/23 1622   BP: 145/67   Pulse: 75   Resp:    Temp: 98 8 °F (37 1 °C)       Notes/comments:   Pt complaining of increasing pelvic pressure  Increasing dilation and fetal descent noted  Pt reassured   Will continue to monitor closely        Gabby Caballero MD 5/7/2023 4:36 PM

## 2023-05-07 NOTE — L&D DELIVERY NOTE
Vaginal Delivery Summary - OB/GYN   Tish Vasquez 40 y o  female MRN: [id number]  Unit/Bed#: L&D 324-01 Encounter: [id number]          Predelivery Diagnosis:  1  Pregnancy at 40 weeks  2  Anemia  3  Obesity   4  Advanced maternal age  11  [address] multiparity  6  GBS negative    Postdelivery Diagnosis:  1  Same as above  2  Delivery of term     Procedure: Spontaneous Vaginal Delivery, no lacerations    Attending: Jay Ventura    Assistant: Bradley    Anesthesia: none    QBL: 5 cc  Admission Hg: 10 9  Admission platelets: 242    Complications: none apparent    Specimens: cord blood, arterial and venous cord blood gasses, placenta to storage    Findings:   1  Viable female at 1655, with APGARS of 9 and 9 at 1 and 5 minutes respectively,  2  Spontaneous delivery of intact placenta   3  No lacerations, perineal abrasion  4  Blood gases:    Umbilical Cord Venous Blood Gas:  Results from last 7 days   Lab Units 23  1656   PH COV  7 339   PCO2 COV mm HG 38 0   HCO3 COV mmol/L 20 0   BASE EXC COV mmol/L -5 2*   O2 CT CD VB mL/dL 16 6   O2 HGB, VENOUS CORD % 34 7     Umbilical Cord Arterial Blood Gas:  Results from last 7 days   Lab Units 23  1656   PH COA  7 289   PCO2 COA  54 0   PO2 COA mm HG 17 0   HCO3 COA mmol/L 25 3   BASE EXC COA mmol/L -2 3*   O2 CONTENT CORD ART ml/dl 6 7   O2 HGB, ARTERIAL CORD % 27 5       Disposition:  Patient tolerated the procedure well and was recovering in labor and delivery room     Brief history and labor course:  Ms Tish Vasquez is a 40 y o  [id number] at 40wk4d  She presented to labor and delivery for eIOL  A ramirez balloon and cytotec were placed  She was started on pitocin after ramirez balloon dislodged  AROMc was performed  She continued to make change to completely dilated and began pushing  Description of procedure    After pushing for less than a minutes, at 1655 patient delivered a viable female , wt 7 lb 6 2 oz, apgars of 9 (1 min) and 9 (5 min)   The fetal vertex delivered spontaneously  Baby was checked for nuchal, one loose loop noted and it reduced easily  The anterior left shoulder delivered atraumatically with maternal expulsive forces and the assistance of downward traction  The posterior right shoulder delivered with maternal expulsive forces and the assistance of upward traction  The remainder of the fetus delivered spontaneously  Upon delivery, the infant was placed on the mothers abdomen and the cord was clamped and cut  Delayed cord clamping was performed  The infant was noted to cry spontaneously and was moving all extremities appropriately  There was no evidence for injury  Awaiting nurse resuscitators evaluated the   Arterial and venous cord blood gases and cord blood was collected for analysis  These were promptly sent to the lab  In the immediate post-partum, 30 units of IV pitocin was administered, and the uterus was noted to contract down well with massage and pitocin  The placenta delivered spontaneously at 1659 and was noted to have a centrally inserted 3 vessel cord  The vagina, cervix, perineum, and rectum were inspected and there was noted to be no perineal lacerations  At the conclusion of the procedure, all needle, sponge, and instrument counts were noted to be correct  Patient tolerated the procedure well and was allowed to recover in labor and delivery room with family and  before being transferred to the post-partum floor  Dr Samm Fam was present and participated in all key portions of the case  Erin Adams MD  2023  5:10 PM     I agree with the delivery report as dictated by Dr Marquise Bhakta and edited by me  I was present for and participated in the entire procedure      Avery Cedillo MD

## 2023-05-07 NOTE — PLAN OF CARE
Problem: BIRTH - VAGINAL/ SECTION  Goal: Fetal and maternal status remain reassuring during the birth process  Description: INTERVENTIONS:  - Monitor vital signs  - Monitor fetal heart rate  - Monitor uterine activity  - Monitor labor progression (vaginal delivery)  - DVT prophylaxis  - Antibiotic prophylaxis  2023 by Sia Rosenthal RN  Outcome: Completed  2023 by Sia Rosenthal RN  Outcome: Progressing  Goal: Emotionally satisfying birthing experience for mother/fetus  Description: Interventions:  - Assess, plan, implement and evaluate the nursing care given to the patient in labor  - Advocate the philosophy that each childbirth experience is a unique experience and support the family's chosen level of involvement and control during the labor process   - Actively participate in both the patient's and family's teaching of the birth process  - Consider cultural, [employer]ism and age-specific factors and plan care for the patient in labor  2023 by Sia Rosenthal RN  Outcome: Completed  2023 by Sia Rosenthal RN  Outcome: Progressing

## 2023-05-07 NOTE — DISCHARGE SUMMARY
Discharge Summary - Shereen Quintanilla 40 y o  female MRN: [id number]    Unit/Bed#: L&D 324-01 Encounter: [id number]    Admission Date: 2023     Discharge Date: 2023    Admitting Attending: Billie Duarte  Delivering Attending: Billie Duarte  Discharge Attending: Adina Chawla Diagnosis:  1  Pregnancy at 40 weeks  2  Anemia  3  Obesity      Postdelivery Diagnosis:  1  Same as above  2  Delivery of term     Procedures: Spontaneous Vaginal Delivery    Complications: none apparent    Hospital Course:   Ms Shereen Quintanilla is a 40 y o  [id number] at 40wk4d  She presented to labor and delivery for eIOL  A ramirez balloon and cytotec were placed  She was started on pitocin after ramirez balloon dislodged  AROMc was performed  She continued to make change to completely dilated and began pushing  She delivered via vaginal delivery with no lacerations  She had an uncomplicated postpartum course  Condition at discharge: good     On day of discharge, patient was tolerating PO, passing flatus, urinating, and ambulating  Her pain was well controlled with oral analgesics  She was discharged home on postpartum day #1 with standard post partum instructions to follow up with her physician in 3-6 weeks for a postpartum appointment  Discharge instructions/Information to patient and family:   - Do not place anything (no partner, tampons or douche) in your vagina for 6 weeks  -You may walk for exercise for the first 6 weeks then gradually return to your usual activities    -Please do not drive for 1 week if you have no stitches and for 2 weeks if you have stitches or underwent a  delivery     -You may take baths or shower per your preference    -Please look at your bust (breasts) in the mirror daily and call for redness or tenderness or increased warmth    -Please call us for temperature > 100 4*F or 38* C, worsening pain or a foul discharge        Discharge Medications:   Prenatal vitamin daily for 6 months or the duration of nursing whichever is longer  Motrin 600 mg orally every 6 hours as needed for pain  Tylenol (over the counter) per bottle directions as needed for pain: do NOT use with percocet  Hydrocortisone cream 1% (over the counter) applied 1-2x daily to hemorrhoids as needed    Provisions for Follow-Up Care: Follow up with your doctor in 3 weeks for postpartum care       Planned Readmission: No

## 2023-05-07 NOTE — PLAN OF CARE

## 2023-05-07 NOTE — OB LABOR/OXYTOCIN SAFETY PROGRESS
Labor Progress Note - Fabienne Cuff 40 y o  female MRN: [id number]    Unit/Bed#: L&D 324-01 Encounter: [id number]       Contraction Frequency (minutes): 3-5  Contraction Quality: Mild  Tachysystole: No   Cervical Dilation: 4-5        Cervical Effacement: 40  Fetal Station: -3  Baseline Rate: 135 bpm  Fetal Heart Rate: 125 BPM              Vital Signs:   Vitals:    05/07/23 1211   BP: 142/70   Pulse: 63   Resp:    Temp:        Notes/comments:   Notified by nurse that ramirez balloon dislodged  SVE as above  FHT cat 1 gaye q2-6 mins  Will start pitocin at this time       Dr Fe Euceda aware     Brent Gibson MD 5/7/2023 1:02 PM

## 2023-05-07 NOTE — ASSESSMENT & PLAN NOTE
Admit  Follow up CBC, RPR, Blood Type  Start with ramirez balloon and cytotec  Method of contraception: none  GBS status: neg   Analgesia and/or epidural at patient request  Anticipate

## 2023-05-08 VITALS
OXYGEN SATURATION: 100 % | HEART RATE: 83 BPM | WEIGHT: 244 LBS | RESPIRATION RATE: 16 BRPM | BODY MASS INDEX: 39.21 KG/M2 | TEMPERATURE: 97.9 F | SYSTOLIC BLOOD PRESSURE: 121 MMHG | HEIGHT: 66 IN | DIASTOLIC BLOOD PRESSURE: 74 MMHG

## 2023-05-08 PROBLEM — O13.9 GESTATIONAL HYPERTENSION: Status: ACTIVE | Noted: 2023-05-08

## 2023-05-08 LAB
ALBUMIN SERPL BCP-MCNC: 3.4 G/DL (ref 3.5–5)
ALP SERPL-CCNC: 141 U/L (ref 34–104)
ALT SERPL W P-5'-P-CCNC: 41 U/L (ref 7–52)
ANION GAP SERPL CALCULATED.3IONS-SCNC: 8 MMOL/L (ref 4–13)
AST SERPL W P-5'-P-CCNC: 42 U/L (ref 13–39)
BILIRUB SERPL-MCNC: 0.31 MG/DL (ref 0.2–1)
BUN SERPL-MCNC: 6 MG/DL (ref 5–25)
CALCIUM ALBUM COR SERPL-MCNC: 9.4 MG/DL (ref 8.3–10.1)
CALCIUM SERPL-MCNC: 8.9 MG/DL (ref 8.4–10.2)
CHLORIDE SERPL-SCNC: 103 MMOL/L (ref 96–108)
CO2 SERPL-SCNC: 21 MMOL/L (ref 21–32)
CREAT SERPL-MCNC: 0.59 MG/DL (ref 0.6–1.3)
ERYTHROCYTE [DISTWIDTH] IN BLOOD BY AUTOMATED COUNT: 13.6 % (ref 11.6–15.1)
GFR SERPL CREATININE-BSD FRML MDRD: 118 ML/MIN/1.73SQ M
GLUCOSE SERPL-MCNC: 170 MG/DL (ref 65–140)
HCT VFR BLD AUTO: 30.4 % (ref 34.8–46.1)
HGB BLD-MCNC: 10.2 G/DL (ref 11.5–15.4)
MCH RBC QN AUTO: 31.2 PG (ref 26.8–34.3)
MCHC RBC AUTO-ENTMCNC: 33.6 G/DL (ref 31.4–37.4)
MCV RBC AUTO: 93 FL (ref 82–98)
PLATELET # BLD AUTO: 160 THOUSANDS/UL (ref 149–390)
PMV BLD AUTO: 13.9 FL (ref 8.9–12.7)
POTASSIUM SERPL-SCNC: 3.8 MMOL/L (ref 3.5–5.3)
PROT SERPL-MCNC: 6.3 G/DL (ref 6.4–8.4)
RBC # BLD AUTO: 3.27 MILLION/UL (ref 3.81–5.12)
SODIUM SERPL-SCNC: 132 MMOL/L (ref 135–147)
TREPONEMA PALLIDUM IGG+IGM AB [PRESENCE] IN SERUM OR PLASMA BY IMMUNOASSAY: NORMAL
WBC # BLD AUTO: 9.13 THOUSAND/UL (ref 4.31–10.16)

## 2023-05-08 RX ORDER — DOCUSATE SODIUM 100 MG/1
100 CAPSULE, LIQUID FILLED ORAL 2 TIMES DAILY
Qty: 30 CAPSULE | Refills: 0 | Status: SHIPPED | OUTPATIENT
Start: 2023-05-09

## 2023-05-08 RX ORDER — IBUPROFEN 600 MG/1
600 TABLET ORAL EVERY 6 HOURS PRN
Qty: 30 TABLET | Refills: 0 | Status: SHIPPED | OUTPATIENT
Start: 2023-05-08

## 2023-05-08 RX ORDER — ACETAMINOPHEN 325 MG/1
650 TABLET ORAL EVERY 4 HOURS PRN
Qty: 90 TABLET | Refills: 0 | Status: SHIPPED | OUTPATIENT
Start: 2023-05-08

## 2023-05-08 RX ADMIN — IBUPROFEN 600 MG: 600 TABLET, FILM COATED ORAL at 00:43

## 2023-05-08 RX ADMIN — IBUPROFEN 600 MG: 600 TABLET, FILM COATED ORAL at 06:37

## 2023-05-08 RX ADMIN — DOCUSATE SODIUM 100 MG: 100 CAPSULE, LIQUID FILLED ORAL at 10:22

## 2023-05-08 RX ADMIN — DOCUSATE SODIUM 100 MG: 100 CAPSULE, LIQUID FILLED ORAL at 17:11

## 2023-05-08 RX ADMIN — WITCH HAZEL 1 PAD.: 500 SOLUTION RECTAL; TOPICAL at 17:12

## 2023-05-08 RX ADMIN — IBUPROFEN 600 MG: 600 TABLET, FILM COATED ORAL at 14:51

## 2023-05-08 NOTE — PLAN OF CARE
Problem: POSTPARTUM  Goal: Experiences normal postpartum course  Description: INTERVENTIONS:  - Monitor maternal vital signs  - Assess uterine involution and lochia  Outcome: Adequate for Discharge  Goal: Appropriate maternal -  bonding  Description: INTERVENTIONS:  - Identify family support  - Assess for appropriate maternal/infant bonding   -Encourage maternal/infant bonding opportunities  - Referral to  or  as needed  Outcome: Adequate for Discharge  Goal: Establishment of infant feeding pattern  Description: INTERVENTIONS:  - Assess breast/bottle feeding  - Refer to lactation as needed  Outcome: Adequate for Discharge  Goal: Incision(s), wounds(s) or drain site(s) healing without S/S of infection  Description: INTERVENTIONS  - Assess and document dressing, incision, wound bed, drain sites and surrounding tissue  - Provide patient and family education  - Perform skin care/dressing changes every   Outcome: Adequate for Discharge     Problem: PAIN - ADULT  Goal: Verbalizes/displays adequate comfort level or baseline comfort level  Description: Interventions:  - Encourage patient to monitor pain and request assistance  - Assess pain using appropriate pain scale  - Administer analgesics based on type and severity of pain and evaluate response  - Implement non-pharmacological measures as appropriate and evaluate response  - Consider cultural and social influences on pain and pain management  - Notify physician/advanced practitioner if interventions unsuccessful or patient reports new pain  Outcome: Adequate for Discharge     Problem: INFECTION - ADULT  Goal: Absence or prevention of progression during hospitalization  Description: INTERVENTIONS:  - Assess and monitor for signs and symptoms of infection  - Monitor lab/diagnostic results  - Monitor all insertion sites, i e  indwelling lines, tubes, and drains  - Monitor endotracheal if appropriate and nasal secretions for changes in amount and color  - [place] appropriate cooling/warming therapies per order  - Administer medications as ordered  - Instruct and encourage patient and family to use good hand hygiene technique  - Identify and instruct in appropriate isolation precautions for identified infection/condition  Outcome: Adequate for Discharge  Goal: Absence of fever/infection during neutropenic period  Description: INTERVENTIONS:  - Monitor WBC    Outcome: Adequate for Discharge     Problem: SAFETY ADULT  Goal: Patient will remain free of falls  Description: INTERVENTIONS:  - Educate patient/family on patient safety including physical limitations  - Instruct patient to call for assistance with activity   - Consult OT/PT to assist with strengthening/mobility   - Keep Call bell within reach  - Keep bed low and locked with side rails adjusted as appropriate  - Keep care items and personal belongings within reach  - Initiate and maintain comfort rounds  - Make Fall Risk Sign visible to staff  - Offer Toileting every  Hours, in advance of need  - Initiate/Maintain alarm  - Obtain necessary fall risk management equipment:   - Apply yellow socks and bracelet for high fall risk patients  - Consider moving patient to room near nurses station  Outcome: Adequate for Discharge  Goal: Maintain or return to baseline ADL function  Description: INTERVENTIONS:  -  Assess patient's ability to carry out ADLs; assess patient's baseline for ADL function and identify physical deficits which impact ability to perform ADLs (bathing, care of mouth/teeth, toileting, grooming, dressing, etc )  - Assess/evaluate cause of self-care deficits   - Assess range of motion  - Assess patient's mobility; develop plan if impaired  - Assess patient's need for assistive devices and provide as appropriate  - Encourage maximum independence but intervene and supervise when necessary  - Involve family in performance of ADLs  - Assess for home care needs following discharge   - Consider OT consult to assist with ADL evaluation and planning for discharge  - Provide patient education as appropriate  Outcome: Adequate for Discharge  Goal: Maintains/Returns to pre admission functional level  Description: INTERVENTIONS:  - Perform BMAT or MOVE assessment daily    - Set and communicate daily mobility goal to care team and patient/family/caregiver  - Collaborate with rehabilitation services on mobility goals if consulted  - Perform Range of Motion  times a day  - Reposition patient every hours  - Dangle patient  times a day  - Stand patient  times a day  - Ambulate patient  times a day  - Out of bed to chair  times a day   - Out of bed for meals  times a day  - Out of bed for toileting  - Record patient progress and toleration of activity level   Outcome: Adequate for Discharge     Problem: DISCHARGE PLANNING  Goal: Discharge to home or other facility with appropriate resources  Description: INTERVENTIONS:  - Identify barriers to discharge w/patient and caregiver  - Arrange for needed discharge resources and transportation as appropriate  - Identify discharge learning needs (meds, wound care, etc )  - Arrange for interpretive services to assist at discharge as needed  - Refer to Case Management Department for coordinating discharge planning if the patient needs post-hospital services based on physician/advanced practitioner order or complex needs related to functional status, cognitive ability, or social support system  Outcome: Adequate for Discharge     Problem: Knowledge Deficit  Goal: Patient/family/caregiver demonstrates understanding of disease process, treatment plan, medications, and discharge instructions  Description: Complete learning assessment and assess knowledge base    Interventions:  - Provide teaching at level of understanding  - Provide teaching via preferred learning methods  Outcome: Adequate for Discharge

## 2023-05-08 NOTE — ASSESSMENT & PLAN NOTE
Systolic (59FBG), ZCK:349 , Min:128 , VSH:325     Diastolic (44OHK), OYW:13, Min:58, Max:90    CBC,  CMP wnl PC ratio 0 20  Asymptomatic  Continue to monitor BP  Can consider antihypertensive management if persistently elevated

## 2023-05-08 NOTE — PROGRESS NOTES
Spoke with patient in regards to blood pressure monitoring in the setting of gHTN  Patient is interested in early discharge (24h postpartum) and is concerned about her most recent BP of 155/83  I explained that our goal is to make sure that she is discharged safely  We are currently monitoring BPs throughout the day and if patient's BPs remain <140/90 then can consider early discharge with outpatient follow up  Patient is hopeful for early discharge given that she has 4 children at home       Ravi Banegas MD  [facility] PGY-2  05/08/23 11:19 AM

## 2023-05-08 NOTE — PLAN OF CARE

## 2023-05-08 NOTE — PROGRESS NOTES
"Progress Note - OB/GYN  Lafe Babinski 40 y o  female MRN: [id number]  Unit/Bed#: L&D 311-01 Encounter: [id number]    Assessment and Plan     Lafe Babinski is a patient of: Dr Zabrina Peterson  She is PPD# 1 s/p  spontaneous vaginal delivery  Recovering well and is stable       Gestational hypertension  Assessment & Plan  Systolic (84TOP), XYU:329 , Min:128 , NZD:155     Diastolic (15TDR), PQ, Min:58, Max:90    CBC,  CMP wnl PC ratio 0 20  Asymptomatic  Continue to monitor BP  Can consider antihypertensive management if persistently elevated       (spontaneous vaginal delivery)  Assessment & Plan  Lochia WNL   Recovering well   Appropriate bowel and bladder function   Pain well controlled   Tolerating diet   Breastfeeding   Ambulating without issues   No lower extremity tenderness  GBS negative   Rh positive       Anemia affecting pregnancy in third trimester  Assessment & Plan  Admission Hgb 10 9    Obesity affecting pregnancy in third trimester  Assessment & Plan  BMI 39      Disposition    - Anticipate discharge home on PPD# 1-2, pending BP management      Subjective/Objective     Chief Complaint: Postpartum State     Subjective:    Lafe Babinski is PPD/POD#1 s/p  spontaneous vaginal delivery  She has no current complaints  Pain is well controlled  Patient is currently voiding  She is ambulating  Patient is currently passing flatus and has had no bowel movement  She is tolerating PO, and denies nausea or vomitting  Patient denies fever, chills, chest pain, shortness of breath, or calf tenderness  Lochia is normal  She is  Breastfeeding  She is recovering well and is stable         Vitals:   /87 (BP Location: Right arm)   Pulse 67   Temp (!) 97 4 °F (36 3 °C) (Oral)   Resp 20   Ht 5' 6\" (1 676 m)   Wt 111 kg (244 lb)   SpO2 100%   Breastfeeding Yes   BMI 39 38 kg/m²       Intake/Output Summary (Last 24 hours) at 2023 0709  Last data filed at 2023 0018  Gross per 24 hour   Intake -- " Output 555 ml   Net -555 ml       Invasive Devices     Peripheral Intravenous Line  Duration           Peripheral IV 05/07/23 Dorsal (posterior); Right Hand <1 day                Physical Exam:   GEN: Gregory Paula appears well, alert and oriented x 3, pleasant and cooperative   CARDIO: RRR, no murmurs or rubs  RESP:  CTAB, no wheezes or rales  ABDOMEN: soft, no tenderness, no distention, fundus @ umbilicus  EXTREMITIES: SCDs on, non tender, no erythema      Labs:     Hemoglobin   Date Value Ref Range Status   05/07/2023 10 9 (L) 11 5 - 15 4 g/dL Final   04/03/2023 10 4 (L) 11 5 - 15 4 g/dL Final     WBC   Date Value Ref Range Status   05/07/2023 9 82 4 31 - 10 16 Thousand/uL Final   04/03/2023 8 21 4 31 - 10 16 Thousand/uL Final     Platelets   Date Value Ref Range Status   05/07/2023 162 149 - 390 Thousands/uL Final   04/03/2023 182 149 - 390 Thousands/uL Final     Creatinine   Date Value Ref Range Status   05/07/2023 0 49 (L) 0 60 - 1 30 mg/dL Final     Comment:     Standardized to IDMS reference method     AST   Date Value Ref Range Status   05/07/2023 46 (H) 13 - 39 U/L Final     ALT   Date Value Ref Range Status   05/07/2023 41 7 - 52 U/L Final     Comment:     Specimen collection should occur prior to Sulfasalazine administration due to the potential for falsely depressed results           Sharmaine White DO  5/8/2023  6:24 AM

## 2023-05-08 NOTE — LACTATION NOTE
This note was copied from a baby's chart  CONSULT - LACTATION  Baby Girl Gentry Fonseca 1 days female MRN: [id number]    [address] [facility] NURSERY Room / Bed: L&D 311(N)/L&D 311(N) Encounter: [id number]    Maternal Information     MOTHER:  Shane Kuo  Maternal Age: 40 y o    OB History: # 1 - Date: , Sex: None, Weight: None, GA: None, Delivery: Spontaneous , Apgar1: None, Apgar5: None, Living: None, Birth Comments: D&C    # 2 - Date: 10/2009, Sex: Male, Weight: 2892 g (6 lb 6 oz), GA: 40w0d, Delivery: None, Apgar1: None, Apgar5: None, Living: Living, Birth Comments: None    # 3 - Date: 2011, Sex: Female, Weight: 3751 g (8 lb 4 3 oz), GA: 40w0d, Delivery: Vaginal, Spontaneous, Apgar1: None, Apgar5: None, Living: Living, Birth Comments: None    # 4 - Date: 2017, Sex: None, Weight: None, GA: None, Delivery: None, Apgar1: None, Apgar5: None, Living: None, Birth Comments: ? chemical pregnancy    # 5 - Date: 2019, Sex: Male, Weight: 3487 g (7 lb 11 oz), GA: 39w1d, Delivery: Vaginal, Spontaneous, Apgar1: None, Apgar5: None, Living: Living, Birth Comments: None    # 6 - Date: 2020, Sex: Male, Weight: 3921 g (8 lb 10 3 oz), GA: 40w6d, Delivery: Vaginal, Spontaneous, Apgar1: None, Apgar5: None, Living: Living, Birth Comments: None    # 7 - Date: 23, Sex: Female, Weight: 3350 g (7 lb 6 2 oz), GA: 40w4d, Delivery: Vaginal, Spontaneous, Apgar1: 9, Apgar5: 9, Living: Living, Birth Comments: None   Previouse breast reduction surgery?  No    Lactation history:   Has patient previously breast fed: Yes   How long had patient previously breast fed: Over a year with ALL her older children   Previous breast feeding complications: None     Past Surgical History:   Procedure Laterality Date   • CHOLECYSTECTOMY     • DILATION AND CURETTAGE OF UTERUS          Birth information:  YOB: 2022   Time of birth: 4:55 PM   Sex: female   Delivery type: Vaginal, Spontaneous   Birth Weight: 3350 g (7 lb 6 2 oz)   Percent of Weight Change: 0%     Gestational Age: 36w2d   [unfilled]    Assessment     Breast and nipple assessment: Denies need for assistance     Assessment: sleepy    Feeding assessment: feeding well as per Mom    LATCH:  Latch: Grasps breast, tongue down, lips flanged, rhythmic sucking   Audible Swallowing: A few with stimulation   Type of Nipple: Everted (After stimulation)   Comfort (Breast/Nipple): Soft/non-tender   Hold (Positioning): No assist from staff, mother able to position/hold infant   LATCH Score: 9          Feeding recommendations:  breast feed on demand       Met with mother to go over Ready, Set Baby and discharge breastfeeding booklet including the feeding log  Emphasized 8 or more (12) feedings in a 24 hour period, what to expect for the number of diapers per day of life and the progression of properties of the  stooling pattern  Reviewed breastfeeding and your lifestyle, storage and preparation of breast milk, how to keep you breast pump clean, the employed breastfeeding mother and paced bottle feeding handouts  Booklet included Breastfeeding Resources for after discharge including access to the number for the [address]  No family support at bedside at this time  [employer] [facility]  to call for assistance, questions and concerns  Extension number for inpatient lactation support provided                Kala Sutton RN 2023 1:50 PM

## 2023-05-08 NOTE — NURSING NOTE
D/C teaching and CHRISTIAN magnet reviewed with patient  Patient asked appropriate questions and verbalized understanding

## 2023-05-15 LAB — PLACENTA IN STORAGE: NORMAL

## 2023-06-05 ENCOUNTER — POSTPARTUM VISIT (OUTPATIENT)
Dept: OBGYN CLINIC | Facility: CLINIC | Age: 38
End: 2023-06-05

## 2023-06-05 VITALS
WEIGHT: 219.2 LBS | HEIGHT: 66 IN | SYSTOLIC BLOOD PRESSURE: 126 MMHG | BODY MASS INDEX: 35.23 KG/M2 | DIASTOLIC BLOOD PRESSURE: 86 MMHG

## 2023-06-05 PROBLEM — O09.523 AMA (ADVANCED MATERNAL AGE) MULTIGRAVIDA 35+, THIRD TRIMESTER: Status: RESOLVED | Noted: 2022-12-01 | Resolved: 2023-06-05

## 2023-06-05 PROBLEM — O13.9 GESTATIONAL HYPERTENSION: Status: RESOLVED | Noted: 2023-05-08 | Resolved: 2023-06-05

## 2023-06-05 PROCEDURE — 99024 POSTOP FOLLOW-UP VISIT: CPT | Performed by: OBSTETRICS & GYNECOLOGY

## 2023-06-05 NOTE — PROGRESS NOTES
Patient is a 40 y o  [id number] with No LMP recorded (lmp unknown)  who presents for post partum examination s/p  on 2023  Pt is without complaints  She reports overall she is feeling well  She reports her lochia has stopped and she has not had a menses as of yet  Pt reports she is Breast feeding  Pt denies si/sx of ppd and scored a 0 on her ppd-E today    Pt reports she has not been sexually active as of yet  She desires condoms for contraception  She is considering sterilization and will let me know if she decides to proceed         Past Medical History:   Diagnosis Date   • Abnormal Pap smear of cervix     , ASCUS/ neg HPV   • Chlamydia infection 10/2013   • Ectopic pregnancy    • Gestational diabetes    • Gestational hypertension 2023   • H/O hernia repair 10/2015   • History of cholecystectomy 2015   • History of gestational diabetes 2018    insulin dependent   • Miscarriage        Past Surgical History:   Procedure Laterality Date   • CHOLECYSTECTOMY     • DILATION AND CURETTAGE OF UTERUS         OB History    Para Term  AB Living   7 5 5 0 2 5   SAB IAB Ectopic Multiple Live Births   1 0 1 0 5      # Outcome Date GA Lbr Cesar/2nd Weight Sex Delivery Anes PTL Lv   7 Term 23 40w4d / 00:01 3350 g (7 lb 6 2 oz) F Vag-Spont EPI  EDER   6 Term 2020 40w6d  3921 g (8 lb 10 3 oz) M Vag-Spont  N EDER   5 Term 2019 39w1d  3487 g (7 lb 11 oz) M Vag-Spont  N EDER   4 Ectopic 2017              Birth Comments: ? chemical pregnancy   3 Term 2011 40w0d  3751 g (8 lb 4 3 oz) F Vag-Spont  N EDER   2 Term 10/2009 40w0d  2892 g (6 lb 6 oz) M   N EDER   1 SAB      SAB         Birth Comments: D&C             Current Outpatient Medications:   •  docusate sodium (COLACE) 100 mg capsule, Take 1 capsule (100 mg total) by mouth 2 (two) times a day Do not start before May 9, 2023 , Disp: 30 capsule, Rfl: 0  •  ibuprofen (MOTRIN) 600 mg tablet, Take 1 tablet (600 mg total) by mouth every 6 (six) hours as needed for moderate pain, Disp: 30 tablet, Rfl: 0  •  Prenatal Vit-Fe Fumarate-FA (PRENATAL 1+1 PO), Take by mouth, Disp: , Rfl:     Allergies   Allergen Reactions   • Sesame Seed (Diagnostic) - Food Allergy Anaphylaxis       Social History     Socioeconomic History   • Marital status: /Civil Union     Spouse name: None   • Number of children: None   • Years of education: None   • Highest education level: None   Occupational History   • None   Tobacco Use   • Smoking status: Never   • Smokeless tobacco: Never   Vaping Use   • Vaping Use: Never used   Substance and Sexual Activity   • Alcohol use: Never   • Drug use: Never   • Sexual activity: Not Currently     Partners: Male   Other Topics Concern   • None   Social History Narrative   • None     Social Determinants of Health     Financial Resource Strain: Not on file   Food Insecurity: Not on file   Transportation Needs: Not on file   Physical Activity: Not on file   Stress: Not on file   Social Connections: Not on file   Intimate Partner Violence: Not on file   Housing Stability: Not on file       Family History   Problem Relation Age of Onset   • Pancreatic cancer Mother    • Cancer Mother         Pancreas cancer   • Hypertension Sister    • Diabetes Maternal Grandmother    • Diabetes Paternal Grandmother    • Dialysis Paternal Grandmother    • Prostate cancer Paternal Grandfather        Review of Systems   Constitutional: Negative for chills, fatigue, fever and unexpected weight change  HENT: Negative for congestion, mouth sores and sore throat  Respiratory: Negative for cough, chest tightness, shortness of breath and wheezing  Cardiovascular: Negative for chest pain and palpitations  Gastrointestinal: Negative for abdominal distention, abdominal pain, constipation, diarrhea, nausea and vomiting  Endocrine: Negative for cold intolerance and heat intolerance     Genitourinary: Negative for dyspareunia (not active "since delivery), dysuria, genital sores, menstrual problem, pelvic pain, vaginal bleeding, vaginal discharge and vaginal pain  Musculoskeletal: Negative for arthralgias  Skin: Negative for color change and rash  Neurological: Negative for dizziness, light-headedness and headaches  Hematological: Negative for adenopathy  Blood pressure 126/86, height 5' 6\" (1 676 m), weight 99 4 kg (219 lb 3 2 oz), currently breastfeeding  and Body mass index is 35 38 kg/m²  Physical Exam  Constitutional:       General: She is not in acute distress  Appearance: Normal appearance  She is well-developed  She is obese  She is not ill-appearing  HENT:      Head: Normocephalic and atraumatic  Eyes:      Extraocular Movements: Extraocular movements intact  Conjunctiva/sclera: Conjunctivae normal    Neck:      Thyroid: No thyromegaly  Trachea: No tracheal deviation  Cardiovascular:      Rate and Rhythm: Normal rate and regular rhythm  Heart sounds: Normal heart sounds  Pulmonary:      Effort: Pulmonary effort is normal  No respiratory distress  Breath sounds: Normal breath sounds  No stridor  No wheezing or rales  Abdominal:      General: Bowel sounds are normal  There is no distension  Palpations: Abdomen is soft  There is no mass  Tenderness: There is no abdominal tenderness  There is no guarding or rebound  Hernia: No hernia is present  Musculoskeletal:         General: No tenderness  Normal range of motion  Cervical back: Normal range of motion and neck supple  Lymphadenopathy:      Cervical: No cervical adenopathy  Skin:     General: Skin is warm  Findings: No erythema or rash  Neurological:      Mental Status: She is alert and oriented to person, place, and time  Psychiatric:         Mood and Affect: Mood normal          Behavior: Behavior normal          Thought Content:  Thought content normal          Judgment: Judgment normal           vulva: " normal external genitalia for age and no lesions, masses, epithelial changes, or exudate  vagina: color pink, rugae  well formed rugae and discharge  white  cervix: parous and no lesions   uterus: NSSC, AF, NT, mobile  adnexa: no masses or tenderness      A/P:  Pt is a 40 y o  [id number] with      Racquel Bue was seen today for postpartum care      Diagnoses and all orders for this visit:    Postpartum care and examination of lactating mother    doing well  Planning on condoms for sexual activity  PPD 0 today  Fu for annual examination

## 2024-04-19 ENCOUNTER — OFFICE VISIT (OUTPATIENT)
Dept: URGENT CARE | Age: 39
End: 2024-04-19
Payer: COMMERCIAL

## 2024-04-19 VITALS
TEMPERATURE: 97.8 F | HEART RATE: 77 BPM | DIASTOLIC BLOOD PRESSURE: 86 MMHG | RESPIRATION RATE: 20 BRPM | OXYGEN SATURATION: 98 % | BODY MASS INDEX: 37.12 KG/M2 | WEIGHT: 230 LBS | SYSTOLIC BLOOD PRESSURE: 110 MMHG

## 2024-04-19 DIAGNOSIS — H61.23 BILATERAL IMPACTED CERUMEN: Primary | ICD-10-CM

## 2024-04-19 PROCEDURE — G0382 LEV 3 HOSP TYPE B ED VISIT: HCPCS | Performed by: PHYSICIAN ASSISTANT

## 2024-04-19 PROCEDURE — 69209 REMOVE IMPACTED EAR WAX UNI: CPT | Performed by: PHYSICIAN ASSISTANT

## 2024-04-19 NOTE — PROGRESS NOTES
[facility]        NAME: Radha Fonseca is a 38 y.o. female  : 1985    MRN: [id number]  DATE: 2024  TIME: 5:50 PM    /86 (BP Location: Right arm, Patient Position: Sitting, Cuff Size: Large)   Pulse 77   Temp 97.8 °F (36.6 °C) (Tympanic)   Resp 20   Wt 104 kg (230 lb)   LMP 2024 (Exact Date)   SpO2 98%   Breastfeeding Yes   BMI 37.12 kg/m²     Assessment and Plan   Bilateral impacted cerumen [H61.23]  1. Bilateral impacted cerumen              Patient Instructions       Follow up with PCP in 3-5 days.  Proceed to  ER if symptoms worsen.    Chief Complaint     Chief Complaint   Patient presents with    Ear Fullness     Pt started yesterday with bilateral ear fullness, denies pain, L worse than R.           History of Present Illness       Pt with bilat ear fullness for several days     Ear Fullness         Review of Systems   Review of Systems   Constitutional: Negative.    HENT: Negative.     Eyes: Negative.    Respiratory: Negative.     Cardiovascular: Negative.    Gastrointestinal: Negative.    Endocrine: Negative.    Genitourinary: Negative.    Musculoskeletal: Negative.    Skin: Negative.    Allergic/Immunologic: Negative.    Neurological: Negative.    Hematological: Negative.    Psychiatric/Behavioral: Negative.     All other systems reviewed and are negative.        Current Medications       Current Outpatient Medications:     docusate sodium (COLACE) 100 mg capsule, Take 1 capsule (100 mg total) by mouth 2 (two) times a day Do not start before May 9, 2023. (Patient not taking: Reported on 2024), Disp: 30 capsule, Rfl: 0    ibuprofen (MOTRIN) 600 mg tablet, Take 1 tablet (600 mg total) by mouth every 6 (six) hours as needed for moderate pain (Patient not taking: Reported on 2024), Disp: 30 tablet, Rfl: 0    Prenatal Vit-Fe Fumarate-FA (PRENATAL 1+1 PO), Take by mouth (Patient not taking: Reported on 2024), Disp: , Rfl:     Current Allergies      Allergies as of 04/19/2024 - Reviewed 04/19/2024   Allergen Reaction Noted    Sesame seed (diagnostic) - food allergy Anaphylaxis 08/15/2018            The following portions of the patient's history were reviewed and updated as appropriate: allergies, current medications, past family history, past medical history, past social history, past surgical history and problem list.     Past Medical History:   Diagnosis Date    Abnormal Pap smear of cervix     2020, ASCUS/ neg HPV    Chlamydia infection 10/2013    Ectopic pregnancy     Gestational diabetes     Gestational hypertension 5/8/2023    H/O hernia repair 10/2015    History of cholecystectomy 05/2015    History of gestational diabetes 12/2018    insulin dependent    Miscarriage        Past Surgical History:   Procedure Laterality Date    CHOLECYSTECTOMY      DILATION AND CURETTAGE OF UTERUS  2008       Family History   Problem Relation Age of Onset    Pancreatic cancer Mother     Cancer Mother         Pancreas cancer    Hypertension Sister     Diabetes Maternal Grandmother     Diabetes Paternal Grandmother     Dialysis Paternal Grandmother     Prostate cancer Paternal Grandfather          Medications have been verified.        Objective   /86 (BP Location: Right arm, Patient Position: Sitting, Cuff Size: Large)   Pulse 77   Temp 97.8 °F (36.6 °C) (Tympanic)   Resp 20   Wt 104 kg (230 lb)   LMP 04/17/2024 (Exact Date)   SpO2 98%   Breastfeeding Yes   BMI 37.12 kg/m²        Physical Exam     Physical Exam  Vitals and nursing note reviewed.   Constitutional:       Appearance: Normal appearance. She is normal weight.   HENT:      Head: Normocephalic and atraumatic.      Right Ear: Tympanic membrane normal.      Left Ear: Tympanic membrane normal.      Ears:      Comments: Bilat cerumen , easlily lavaged clear with warm water,  tm wnl bilat no erythema      Nose: Nose normal.      Mouth/Throat:      Mouth: Mucous membranes are moist.      Pharynx:  Oropharynx is clear.   Eyes:      Extraocular Movements: Extraocular movements intact.      Conjunctiva/sclera: Conjunctivae normal.      Pupils: Pupils are equal, round, and reactive to light.   Cardiovascular:      Rate and Rhythm: Normal rate and regular rhythm.      Pulses: Normal pulses.      Heart sounds: Normal heart sounds.   Pulmonary:      Effort: Pulmonary effort is normal.      Breath sounds: Normal breath sounds.   Abdominal:      General: Abdomen is flat. Bowel sounds are normal.      Palpations: Abdomen is soft.   Musculoskeletal:         General: Normal range of motion.      Cervical back: Normal range of motion and neck supple.   Skin:     General: Skin is warm.      Capillary Refill: Capillary refill takes less than 2 seconds.   Neurological:      Mental Status: She is alert and oriented to person, place, and time.   Psychiatric:         Mood and Affect: Mood normal.

## 2024-07-19 ENCOUNTER — ULTRASOUND (OUTPATIENT)
Dept: OBGYN CLINIC | Facility: CLINIC | Age: 39
End: 2024-07-19
Payer: COMMERCIAL

## 2024-07-19 VITALS
OXYGEN SATURATION: 100 % | WEIGHT: 234.2 LBS | BODY MASS INDEX: 37.64 KG/M2 | SYSTOLIC BLOOD PRESSURE: 116 MMHG | HEIGHT: 66 IN | DIASTOLIC BLOOD PRESSURE: 76 MMHG

## 2024-07-19 DIAGNOSIS — O36.80X1 ENCOUNTER TO DETERMINE FETAL VIABILITY OF PREGNANCY, FETUS 1: Primary | ICD-10-CM

## 2024-07-19 NOTE — PROGRESS NOTES
FIRST TRIMESTER OBSTETRIC ULTRASOUND  7/19/2024   Santiago Mckeon MD     INDICATION: Amenorrhea, viability  .  COMPARISON: None.     TECHNIQUE:   Transvaginal imaging was performed to assess the gestation, myometrial/endometrial architecture and ovarian parenchymal detail.    The study includes volumetric sweeps and traditional still imaging technique.      FINDINGS:     A single intrauterine gestation is identified.  Cardiac activity is detected at 171 bpm.      YOLK SAC:  Present and normal in size and appearance.  MEAN CROWN RUMP LENGTH:  24.1 mm = 9 weeks 0 days   AMNIOTIC FLUID/SAC SHAPE:  Within expected normal range.     UTERUS/ADNEXA:   No adnexal mass or pathologic cyst.  No free fluid identified.     IMPRESSION:     Single intrauterine pregnancy of 9 weeks 0d gestational age by ultrasound.  Fetal cardiac activity detected.  No adnexal masses seen.  Patient reports LMP as 5/15/24; she has regular 28-30 day cycles and is sure of the date. Ultrasound consistent with LMP. Final VASILE 2/19/25 by LMP.

## 2024-07-22 ENCOUNTER — TELEPHONE (OUTPATIENT)
Age: 39
End: 2024-07-22

## 2024-07-23 ENCOUNTER — TELEPHONE (OUTPATIENT)
Age: 39
End: 2024-07-23

## 2024-07-24 ENCOUNTER — TELEPHONE (OUTPATIENT)
Dept: OBGYN CLINIC | Facility: CLINIC | Age: 39
End: 2024-07-24

## 2024-07-24 ENCOUNTER — TELEPHONE (OUTPATIENT)
Age: 39
End: 2024-07-24

## 2024-07-26 ENCOUNTER — TELEPHONE (OUTPATIENT)
Age: 39
End: 2024-07-26

## 2024-08-06 ENCOUNTER — TELEPHONE (OUTPATIENT)
Dept: OBGYN CLINIC | Facility: CLINIC | Age: 39
End: 2024-08-06

## 2024-08-06 NOTE — TELEPHONE ENCOUNTER
Called patient  x 2 for virtual OB intake .  Left message to call back to reschedule appt which does need to be completed prior to first routine prenatal appt.

## 2024-08-13 ENCOUNTER — INITIAL PRENATAL (OUTPATIENT)
Dept: OBGYN CLINIC | Facility: CLINIC | Age: 39
End: 2024-08-13

## 2024-08-13 VITALS — WEIGHT: 234 LBS | HEIGHT: 66 IN | BODY MASS INDEX: 37.61 KG/M2

## 2024-08-13 DIAGNOSIS — Z86.32 HISTORY OF GESTATIONAL DIABETES MELLITUS (GDM) IN PRIOR PREGNANCY, CURRENTLY PREGNANT IN SECOND TRIMESTER: ICD-10-CM

## 2024-08-13 DIAGNOSIS — Z87.59 HISTORY OF PREGNANCY INDUCED HYPERTENSION: ICD-10-CM

## 2024-08-13 DIAGNOSIS — O09.292 HISTORY OF GESTATIONAL DIABETES MELLITUS (GDM) IN PRIOR PREGNANCY, CURRENTLY PREGNANT IN SECOND TRIMESTER: ICD-10-CM

## 2024-08-13 DIAGNOSIS — O09.523 AMA (ADVANCED MATERNAL AGE) MULTIGRAVIDA 35+, THIRD TRIMESTER: ICD-10-CM

## 2024-08-13 DIAGNOSIS — Z3A.13 13 WEEKS GESTATION OF PREGNANCY: ICD-10-CM

## 2024-08-13 DIAGNOSIS — Z34.81 SUPERVISION OF NORMAL INTRAUTERINE PREGNANCY IN MULTIGRAVIDA IN FIRST TRIMESTER: Primary | ICD-10-CM

## 2024-08-13 PROCEDURE — OBC

## 2024-08-13 NOTE — PATIENT INSTRUCTIONS
Congratulations on your pregnancy!  We thank you for allowing us to participate in your care.    NEXT STEPS    Go to the lab to have your prenatal blood work competed, if you have not already done so.  We ask that you have this blood work done prior to your first routine prenatal appointment.  There is a listing of [facility] Laboratories and locations in your prenatal folder. You may also visit [email or web address] or call [phone].   Please be aware that some insurance companies may require you to go to a specific lab (ex. [facility] or [facility]). You can verify this by contacting your insurance company.   If you have decided to have genetic testing done at [facility]etal [facility], that will be scheduled by [facility]. You may have already scheduled this appointment.  If not, please call their office to schedule this appointment.  Based on the referral placed by our office, they will know how to schedule you appropriately.    Contact information for [facility] is located in your prenatal folder. The main phone number to their office is [phone]..   Return to our office for your first routine prenatal visit.   Some offices have multiple locations. Always check the address of your appointment to ensure you are going to the correct office.   If you experience any problems or concerns, call the office directly.  Remember to only use [employer] for none urgent concerns or questions.  Our doctors deliver at [facility] in [place]. The address is provided below.     [place] [id number]    Click on the links below to review our Pregnancy Essential Guide.  [facility] Pregnancy Essentials Guide  [facility] ([email or web address])     Click on the link below to review [facility] Lab locations.  [facility] Lab Locations    [employer] resource  [employer] is a tool to connect you to community resources you may need.    Thank you,  Renae TORRE LPN  OB Nurse  Navigator

## 2024-08-13 NOTE — PROGRESS NOTES
OB INTAKE INTERVIEW 2024    The patient was identified by name and date of birth and was informed that this is a virtual visit being conducted through a secure, HIPAA-complaint platform. I am in a private office space with the door closed. Patient acknowledged understanding of privacy.  She agrees to proceed and is aware that she may discontinue the visit at any time.     Patient is 38 y.o. who presents for OB intake at 12w6d.  She is not accompanied by anyone during this encounter which was virtual.  The father of her baby (Vince Mitchell) is involved in the pregnancy and he is 40 years old.      Patient's last menstrual period was 05/15/2024 (exact date).  Ultrasound: Measured 9 weeks 0 days on 2024  Estimated Date of Delivery: 25 confirmed by dating ultrasound.    Signs/Symptoms of Pregnancy  Current pregnancy symptoms: fatigue, nausea, vomiting, and frequent urination  Headaches no  Cramping no  Spotting no  PICA cravings no    Diabetes-  Body mass index is 37.77 kg/m².  If patient has 1 or more, please order early 1 hour GTT  History of GDM YES  BMI >35 YES  History of PCOS or current metformin use no  History of LGA/macrosomic infant (4000g/9lbs) no    If patient has 2 or more, please order early 1 hour GTT  BMI>30 YES  AMA YES  First degree relative with type 2 diabetes no  History of chronic HTN, hyperlipidemia, elevated A1C no  High risk race (, , ,  or ) YES-African American    Hypertension- if you answer yes to any of the following, please order baseline preeclampsia labs (cbc, comprehensive metabolic panel, urine protein creatinine ratio, uric acid)  History of of chronic HTN no  History of gestational HTN YES  History of preeclampsia, eclampsia, or HELLP syndrome no  History of diabetes no  History of lupus, autoimmune disease, kidney disease no    Thyroid- if yes order TSH with reflex T4  History of thyroid disease  no    Bleeding Disorder or Hx of DVT-patient or first degree relative with history of. Order the following if not done previously.   (Factor V, antithrombin III, prothrombin gene mutation, protein C and S Ag, lupus anticoagulant, anticardiolipin, beta-2 glycoprotein)   no    OB/GYN-  History of abnormal pap smear YES-ASCUS neg HPV     Date of last pap smear 2020  History of HPV no  History of Herpes/HSV no  History of other STI (gonorrhea, chlamydia, trich) YES-CT  History of prior  YESx5  History of prior  no  History of  delivery prior to 36 weeks 6 days no  History of blood transfusion no  Ok for blood transfusion Yes    Substance screening-   History of tobacco use no  Currently using tobacco no  Substance Use Screen Level (N/A, LOW, HIGH) N/A    MRSA Screening-   Does the pt have a hx of MRSA? no    Immunizations:  Discussed Tdap vaccine:  YES  Discussed COVID Vaccine:  YES    Genetic/MFM-  Do you or your partner have a history of any of the following in yourselves or first degree relatives?  Cystic fibrosis no  Spinal muscular atrophy no  Hemoglobinopathy/Sickle Cell/Thalassemia no  Fragile X Intellectual Disability no    If yes, discuss Carrier Screening and recommend consultation with MFM/Genetic Counseling and place specific [facility] Referral for.    If no, discuss Carrier Screening being completed once in a lifetime as a standard of care lab test. Place orders for Cystic Fibrosis Gene Test (ONH963) and Spinal Muscular Atrophy DNA ([id number]).  Patient was informed that prior authorization needs to be completed for these tests and this may take 7-10 business days.  Patient does not desire testing for Cystic Fibrosis and Spinal Muscular Atrophy.    Appointment for Nuchal Translucency Ultrasound at [facility]  Patient declined US and genetic testing.    Interview education  [facility] Pregnancy Essentials Book reviewed, discussed and attached to their AVS:  YES    Nurse/Family  Partnership-patient may qualify NO; referral placed NO     Prenatal lab work scripts YES    Extra labs ordered: Hgb Fractionation Cascade, 1 hour GTT, CMP, Protein/creatinine ratio urine, and Uric Acid    Aspirin/Preeclampsia Screen    Risk Level Risk Factor Recommendation   LOW Prior Uncomplicated full-term delivery YES No Aspirin recommendation        MODERATE Nulliparity no Recommend low-dose aspirin if     BMI>30 YES 2 or more moderate risk factors    Family History Preeclampsia (mother/sister) YES-1/2 sister     35yr old or greater YES      or Low Socioeconomic YES     IVF Pregnancy  no     Personal History Risks (low birth weight, prior adverse preg outcome, >10yr preg interval) no         HIGH History of Preeclampsia no-had PIH Recommend low-dose aspirin if     Multifetal gestation no 1 or more high risk factors    Chronic HTN no     Type 1 or 2 Diabetes no     Renal Disease no     Autoimmune Disease  no      Contraindications to ASA therapy:  NSAID/ ASA allergy: no  Nasal polyps: no  Asthma with history of ASA induced bronchospasm: no  Relative contraindications:  History of GI bleed: no  Active peptic ulcer disease: no  Severe hepatic dysfunction: no    Patient does meet recommendation to take ASA 162mg during this pregnancy from 12-36wks to lower her risk of preeclampsia.  Instructions given and patient verbalized understanding.    Mental Health:  History of depression: no  History of anxiety: no  EPDS Screen:  Negative   Score:  0    Dental Exam:  Last dental exam within the past 6 months: No    The patient has a history now or in prior pregnancy notable for: gestational HTN, gestational diabetes, AMA, and obesity.    Details that I feel the provider should be aware of: Patient declined NT ultrasound/genetic testing appt.  States she will do 20 week detailed US.     PN1 visit scheduled. The patient was oriented to our practice and the navigator role.  Reviewed delivering physicians and  [facility] for delivery. All questions were answered.    Interviewed by: DAGO AUSTNI LPN

## 2024-08-16 ENCOUNTER — TELEPHONE (OUTPATIENT)
Dept: OBGYN CLINIC | Facility: CLINIC | Age: 39
End: 2024-08-16

## 2024-08-16 NOTE — TELEPHONE ENCOUNTER
Called pt regarding canceled PNV1 today, which pt canceled through mychart. Pt stated she would call back later today to reschedule this appt.

## 2024-09-10 ENCOUNTER — INITIAL PRENATAL (OUTPATIENT)
Dept: OBGYN CLINIC | Facility: CLINIC | Age: 39
End: 2024-09-10

## 2024-09-10 ENCOUNTER — TELEPHONE (OUTPATIENT)
Age: 39
End: 2024-09-10

## 2024-09-10 ENCOUNTER — TELEPHONE (OUTPATIENT)
Dept: OBGYN CLINIC | Facility: CLINIC | Age: 39
End: 2024-09-10

## 2024-09-10 VITALS
HEART RATE: 87 BPM | SYSTOLIC BLOOD PRESSURE: 110 MMHG | HEIGHT: 66 IN | BODY MASS INDEX: 39.1 KG/M2 | OXYGEN SATURATION: 99 % | DIASTOLIC BLOOD PRESSURE: 70 MMHG | WEIGHT: 243.3 LBS

## 2024-09-10 DIAGNOSIS — O09.522 AMA (ADVANCED MATERNAL AGE) MULTIGRAVIDA 35+, SECOND TRIMESTER: ICD-10-CM

## 2024-09-10 DIAGNOSIS — O09.42 GRAND MULTIPARITY WITH CURRENT PREGNANCY IN SECOND TRIMESTER: Primary | ICD-10-CM

## 2024-09-10 DIAGNOSIS — Z3A.16 16 WEEKS GESTATION OF PREGNANCY: ICD-10-CM

## 2024-09-10 DIAGNOSIS — Z11.3 SCREENING FOR STD (SEXUALLY TRANSMITTED DISEASE): ICD-10-CM

## 2024-09-10 PROCEDURE — 87591 N.GONORRHOEAE DNA AMP PROB: CPT | Performed by: OBSTETRICS & GYNECOLOGY

## 2024-09-10 PROCEDURE — 87491 CHLMYD TRACH DNA AMP PROBE: CPT | Performed by: OBSTETRICS & GYNECOLOGY

## 2024-09-10 PROCEDURE — PNV: Performed by: OBSTETRICS & GYNECOLOGY

## 2024-09-10 NOTE — TELEPHONE ENCOUNTER
Overall how are you doing? Patient states she is doing well.    Compliant with routine OB care appointments? First routine prenatal appt was at 16 weeks.    Have you completed your 1st trimester labs? No, reminded patient to complete blood work.     If you had NIPS with MFM, do you have a order for MSAFP? Patient declined MSAFP.     Can be completed 15w-22w9d, ideally 16w-18w    Have you seen MFM and do you have your detailed US scheduled? No, patient states she will call today to schedule. .    Pregnancy Education-have you had a chance to review the classes offered and registered? No, reviewed prenatal classes and instructions for registering with patient.     EPDS Score: Link sent via [employer].  Reminded patient to complete.

## 2024-09-10 NOTE — TELEPHONE ENCOUNTER
Patient called MFM to schedule 20w US, there is no referral for MFM for 20w US, only for her nuchal. Patient informed that there is not a referral on file right now to schedule her 20w US. Patient was going to call OB office for a referral but I informed patient I can send a message. Patient appreciative and no further questions.

## 2024-09-10 NOTE — PROGRESS NOTES
OB/GYN  PRENATAL H&P VISIT  Radha Fonseca  9/10/2024  11:28 AM  Dr. Kia Elias MD      SUBJECTIVE  Patient is a  at 16w6d here for initial prenatal H&P. This is an intended and desired pregnancy.     She is currently doing well. She works remote.     She reports hx of chlamydia nad trichomonisasis denies a hx of TB or close contacts with persons with TB. She has not had MRSA.     She denies a family history of inheritable conditions such as physical or intellectual disabilities, birth defects, blood disorders, heart or neural tube defects.     She denies recent travel or travel planned in the near future.     She denies use of nicotine or recreational drug use. She denies use of ETOH.    She denies vaginal bleeding, cramping, leakage, abnormal discharge.     OB History    Para Term  AB Living   8 5 5 0 2 5   SAB IAB Ectopic Multiple Live Births   1 0 1 0 5      # Outcome Date GA Lbr Cesar/2nd Weight Sex Type Anes PTL Lv   8 Current            7 Term 23 40w4d / 00:01 3350 g (7 lb 6.2 oz) F Vag-Spont EPI  EDER      Name: LOIS,BABY GIRL (RADHA)      Apgar1: 9  Apgar5: 9   6 Term 2020 40w6d  3921 g (8 lb 10.3 oz) M Vag-Spont  N EDER   5 Term 2019 39w1d  3487 g (7 lb 11 oz) M Vag-Spont  N EDER   4 Ectopic 2017              Birth Comments: ? chemical pregnancy   3 Term 2011 40w0d  3751 g (8 lb 4.3 oz) F Vag-Spont  N EDER   2 Term 10/2009 40w0d  2892 g (6 lb 6 oz) M Vag-Spont  N EDER      Name: Antionette   1 SAB      SAB         Birth Comments: D&C       Review of Systems   Constitutional:  Negative for chills and fever.   Respiratory:  Negative for cough, shortness of breath and wheezing.    Cardiovascular:  Negative for chest pain.   Gastrointestinal:  Negative for abdominal pain, nausea and vomiting.   Genitourinary:  Negative for dysuria, hematuria, urgency, vaginal bleeding and vaginal discharge.   Neurological:  Negative for dizziness, weakness, light-headedness and  headaches.       Past Medical History:   Diagnosis Date    Abnormal Pap smear of cervix     2020, ASCUS/ neg HPV    Chlamydia     2013    Ectopic pregnancy     Gestational hypertension 05/08/2023    H/O hernia repair 10/2015    History of cholecystectomy 05/2015    History of gestational diabetes 12/2018    insulin dependent    Miscarriage     Varicella        Past Surgical History:   Procedure Laterality Date    CHOLECYSTECTOMY      DILATION AND CURETTAGE OF UTERUS  2008       Social History     Socioeconomic History    Marital status: /Civil Union     Spouse name: Not on file    Number of children: Not on file    Years of education: Not on file    Highest education level: Not on file   Occupational History    Not on file   Tobacco Use    Smoking status: Never    Smokeless tobacco: Never   Vaping Use    Vaping status: Never Used   Substance and Sexual Activity    Alcohol use: Not Currently    Drug use: Never    Sexual activity: Yes     Partners: Male     Birth control/protection: None   Other Topics Concern    Not on file   Social History Narrative    Not on file     Social Determinants of Health     Financial Resource Strain: Not on file   Food Insecurity: No Food Insecurity (8/13/2024)    Hunger Vital Sign     Worried About Running Out of Food in the Last Year: Never true     Ran Out of Food in the Last Year: Never true   Transportation Needs: No Transportation Needs (8/13/2024)    PRAPARE - Transportation     Lack of Transportation (Medical): No     Lack of Transportation (Non-Medical): No   Physical Activity: Not on file   Stress: Not on file   Social Connections: Not on file   Intimate Partner Violence: Not on file   Housing Stability: Low Risk  (8/13/2024)    Housing Stability Vital Sign     Unable to Pay for Housing in the Last Year: No     Number of Times Moved in the Last Year: 1     Homeless in the Last Year: No       OBJECTIVE  Vitals:    09/10/24 1102   BP: 110/70   Pulse: 87   SpO2: 99%  "    Physical Exam  Constitutional:       Appearance: She is well-developed. She is obese.   Cardiovascular:      Rate and Rhythm: Normal rate and regular rhythm.      Heart sounds: Normal heart sounds. No murmur heard.     No friction rub. No gallop.   Pulmonary:      Effort: Pulmonary effort is normal. No respiratory distress.      Breath sounds: No wheezing.   Abdominal:      Palpations: Abdomen is soft.      Tenderness: There is no abdominal tenderness.   Musculoskeletal:         General: No tenderness.   Neurological:      Mental Status: She is alert and oriented to person, place, and time.   Vitals reviewed.         ASSESSMENT AND PLAN    39 y.o., , with /70 (BP Location: Right arm, Patient Position: Sitting, Cuff Size: Large)   Pulse 87   Ht 5' 6\" (1.676 m)   Wt 110 kg (243 lb 4.8 oz)   LMP 05/15/2024 (Exact Date) , at 16w6d here for her prenatal H&P.    's by doppler    Problem List       Tension type headache    16 weeks gestation of pregnancy    Overview     Prenatal labs not done, needs early 1hr glucola  [ ]MSAFP ordered  [ ]Flu vaccine ,[ ] TDAP vaccine   [ ]Delivery consent  [ ]Contraception             Grand multiparity with current pregnancy in second trimester       Pregnancy: H&P completed today. PN Labs not yet completed, encouraged to complete.  Labor expectations discussed with patient, including appointment schedule, nutrition, exercise, medications, sexual intercourse, and nausea/vomiting. Patient's BMI is 37. Recommended weight gain is 11-20lbs.     Screening: Pap smear in  was negative. GC/CT collected. Genetic screening reviewed with patient -she declines     Consents: Delivery process including potential OVD and  reviewed. Sign delivery consent form at 28 weeks.    Contraception: Different methods of contraception were discussed with patient, including progesterone only oral pills, depo provera, nexplanon, mirena, and paragard. Patient would like to use " OCP's during postpartum phase.    Follow up: RTC in 4 weeks. Precautions regarding labor, leakage, bleeding, and fetal movement reviewed.    Kia Elias MD  9/10/2024  11:28 AM

## 2024-09-11 LAB
C TRACH DNA SPEC QL NAA+PROBE: NEGATIVE
N GONORRHOEA DNA SPEC QL NAA+PROBE: NEGATIVE

## 2024-10-10 ENCOUNTER — TELEPHONE (OUTPATIENT)
Age: 39
End: 2024-10-10

## 2024-10-10 NOTE — TELEPHONE ENCOUNTER
Patient calling for an update regarding gas shut off form. Warm transfer to office spoke with Amber, who states she does not have any faxes. Patient aware to that no fax came through and she can check back a little late. Please call patient once form is completed or any further assistance.

## 2024-10-10 NOTE — TELEPHONE ENCOUNTER
Pt called and is asking for the deadline or when to get her 1 hr glucose, and all her labs that were ordered

## 2024-10-10 NOTE — TELEPHONE ENCOUNTER
OB Patient called asking for a form that is going to be sent over via fax from the gas company to be completed ASAP as her gas is shut off. Patient also asking if she can get a letter as well from office. Warm transfer to office spoke with Liz to see if there was another fax number, Liz states the fax number for office is [phone]. Patient appreciative of fax number and will call back if she needs any further assistance.

## 2024-10-10 NOTE — TELEPHONE ENCOUNTER
Called patient back to inform her that we still have not received the fax. Confirmed that she had the correct number and told her we would look out for it. Patient stated that she was trying to get the form filled out today to have her problem resolved today but since it is late in the day she instructed us to now disregard the form as the gas company is coming out tomorrow.     No further action required.

## 2024-10-10 NOTE — TELEPHONE ENCOUNTER
Patient called and asked if you received the faxed letter yet and she asked if you can please call her back either way. She can be reached at [phone] . Thank you

## 2024-10-10 NOTE — TELEPHONE ENCOUNTER
Outgoing call to patient. Informed to have 1hr gtt performed as soon as possible as this was ordered with initial prenatal labs. Patient verbalized understanding.

## 2024-10-11 ENCOUNTER — TELEPHONE (OUTPATIENT)
Age: 39
End: 2024-10-11

## 2024-10-11 NOTE — TELEPHONE ENCOUNTER
Patient called and canceled her appointment today and I do not see any openings next week and she would like to go to [place] location.  Please call her back at [phone] . Thank you

## 2024-10-15 ENCOUNTER — ROUTINE PRENATAL (OUTPATIENT)
Age: 39
End: 2024-10-15
Payer: COMMERCIAL

## 2024-10-15 VITALS
HEART RATE: 98 BPM | DIASTOLIC BLOOD PRESSURE: 62 MMHG | WEIGHT: 244 LBS | SYSTOLIC BLOOD PRESSURE: 118 MMHG | HEIGHT: 66 IN | BODY MASS INDEX: 39.21 KG/M2

## 2024-10-15 DIAGNOSIS — E66.812 CLASS II OBESITY: ICD-10-CM

## 2024-10-15 DIAGNOSIS — O09.42 GRAND MULTIPARITY WITH CURRENT PREGNANCY IN SECOND TRIMESTER: ICD-10-CM

## 2024-10-15 DIAGNOSIS — Z3A.21 21 WEEKS GESTATION OF PREGNANCY: Primary | ICD-10-CM

## 2024-10-15 DIAGNOSIS — Z13.79 ENCOUNTER FOR OTHER SCREENING FOR GENETIC AND CHROMOSOMAL ANOMALIES: ICD-10-CM

## 2024-10-15 PROCEDURE — 76811 OB US DETAILED SNGL FETUS: CPT | Performed by: OBSTETRICS & GYNECOLOGY

## 2024-10-15 PROCEDURE — 99214 OFFICE O/P EST MOD 30 MIN: CPT | Performed by: OBSTETRICS & GYNECOLOGY

## 2024-10-15 NOTE — LETTER
October 15, 2024     ABA Jones  [address]  [address]  [place] [id number]    Patient: Radha Fonseca   YOB: 1985   Date of Visit: 10/15/2024       Dear Ms Hugo:    Thank you for referring Radha Fonseca to me for evaluation. Below are my notes for this consultation.    If you have questions, please do not hesitate to call me. I look forward to following your patient along with you.         Sincerely,        Ambrocio Amaro MD        CC: No Recipients    Ambrocio Amaro MD  10/15/2024  2:26 PM  Sign when Signing Visit  A fetal ultrasound was completed. See Ob procedures in Epic for an interpretation and recommendations. Do not hesitate to contact us in [facility] if you have questions.    Ambrocio Amaro MD, MSCE  Maternal Fetal Medicine

## 2024-10-15 NOTE — PROGRESS NOTES
Ultrasound Probe Disinfection    A transvaginal ultrasound was performed.   Prior to use, disinfection was performed with High Level Disinfection Process ([employer]).  Probe serial number [id number]: [id number] was used.    Kay Matthews  10/15/24  8:15 AM

## 2024-10-15 NOTE — PROGRESS NOTES
A fetal ultrasound was completed. See Ob procedures in Epic for an interpretation and recommendations. Do not hesitate to contact us in [facility] if you have questions.    Ambrocio Amaro MD, MSCE  Maternal Fetal Medicine

## 2024-10-18 ENCOUNTER — ROUTINE PRENATAL (OUTPATIENT)
Dept: OBGYN CLINIC | Facility: CLINIC | Age: 39
End: 2024-10-18

## 2024-10-18 VITALS
OXYGEN SATURATION: 98 % | HEIGHT: 66 IN | HEART RATE: 93 BPM | BODY MASS INDEX: 39.18 KG/M2 | DIASTOLIC BLOOD PRESSURE: 64 MMHG | WEIGHT: 243.8 LBS | SYSTOLIC BLOOD PRESSURE: 118 MMHG

## 2024-10-18 DIAGNOSIS — O09.92 ENCOUNTER FOR SUPERVISION OF HIGH RISK PREGNANCY IN SECOND TRIMESTER, ANTEPARTUM: Primary | ICD-10-CM

## 2024-10-18 DIAGNOSIS — O09.522 AMA (ADVANCED MATERNAL AGE) MULTIGRAVIDA 35+, SECOND TRIMESTER: ICD-10-CM

## 2024-10-18 DIAGNOSIS — Z3A.22 22 WEEKS GESTATION OF PREGNANCY: ICD-10-CM

## 2024-10-18 DIAGNOSIS — O09.42 GRAND MULTIPARITY WITH CURRENT PREGNANCY IN SECOND TRIMESTER: ICD-10-CM

## 2024-10-18 PROBLEM — O09.32 INSUFFICIENT PRENATAL CARE IN SECOND TRIMESTER: Status: ACTIVE | Noted: 2024-10-18

## 2024-10-18 PROCEDURE — PNV: Performed by: OBSTETRICS & GYNECOLOGY

## 2024-10-18 NOTE — PROGRESS NOTES
"Assessment & Plan  39 y.o.  at 22w2d presenting for routine prenatal visit.     Problem List       Tension type headache    22 weeks gestation of pregnancy    Overview     Prenatal labs not done, needs early 1hr glucola - planning to do Fresh Test  [ ]Flu vaccine ,[ ] TDAP vaccine   [ ]Delivery consent  [ ]Contraception             Grand multiparity with current pregnancy in second trimester    AMA (advanced maternal age) multigravida 35+, second trimester    Insufficient prenatal care in second trimester     Other Visit Diagnoses       Encounter for supervision of high risk pregnancy in second trimester, antepartum    -  Primary          ____________________________________________________________  Subjective  She is without complaint.   She denies contractions, loss of fluid, or vaginal bleeding.   She feels regular fetal movements.     Objective  /64 (BP Location: Right arm, Patient Position: Sitting, Cuff Size: Large)   Pulse 93   Ht 5' 6\" (1.676 m)   Wt 111 kg (243 lb 12.8 oz)   LMP 05/15/2024 (Exact Date)   SpO2 98%   BMI 39.35 kg/m²   FHR: 140 bpm    Physical Exam  Constitutional:       Appearance: She is well-developed.   Cardiovascular:      Rate and Rhythm: Normal rate.   Pulmonary:      Effort: Pulmonary effort is normal. No respiratory distress.   Abdominal:      Palpations: Abdomen is soft.      Tenderness: There is no abdominal tenderness.   Skin:     General: Skin is warm and dry.          Patient's Active Problem List  Patient Active Problem List   Diagnosis    Tension type headache    22 weeks gestation of pregnancy    Grand multiparity with current pregnancy in second trimester    AMA (advanced maternal age) multigravida 35+, second trimester    Insufficient prenatal care in second trimester           Linda Henderson MD  10/18/2024  7:59 AM          "

## 2024-11-14 ENCOUNTER — ROUTINE PRENATAL (OUTPATIENT)
Dept: OBGYN CLINIC | Facility: CLINIC | Age: 39
End: 2024-11-14

## 2024-11-14 VITALS
BODY MASS INDEX: 39.73 KG/M2 | WEIGHT: 247.2 LBS | HEART RATE: 90 BPM | DIASTOLIC BLOOD PRESSURE: 70 MMHG | OXYGEN SATURATION: 100 % | SYSTOLIC BLOOD PRESSURE: 116 MMHG | HEIGHT: 66 IN

## 2024-11-14 DIAGNOSIS — O99.212 OBESITY DURING PREGNANCY IN SECOND TRIMESTER: ICD-10-CM

## 2024-11-14 DIAGNOSIS — O09.32 INSUFFICIENT PRENATAL CARE IN SECOND TRIMESTER: ICD-10-CM

## 2024-11-14 DIAGNOSIS — Z3A.22 22 WEEKS GESTATION OF PREGNANCY: ICD-10-CM

## 2024-11-14 DIAGNOSIS — O09.522 AMA (ADVANCED MATERNAL AGE) MULTIGRAVIDA 35+, SECOND TRIMESTER: Primary | ICD-10-CM

## 2024-11-14 DIAGNOSIS — O09.42 GRAND MULTIPARITY WITH CURRENT PREGNANCY IN SECOND TRIMESTER: ICD-10-CM

## 2024-11-14 PROCEDURE — PNV: Performed by: NURSE PRACTITIONER

## 2024-11-14 NOTE — PATIENT INSTRUCTIONS
"Patient Education     Your baby's movement before birth   The Basics   Written by the doctors and editors at [facility]   When should I start feeling my baby move? -- It depends. Most people first feel their baby moving in the uterus between about 16 and 20 weeks of pregnancy. It might take longer to feel movement if this is your first pregnancy or if the placenta is in the front of your uterus.  What kinds of movements should I feel? -- When you first feel your baby move, it might feel like a gentle flutter in your belly. This is sometimes called \"quickening.\" As the baby grows, their movements will get stronger. You will probably feel them kicking, rolling, and stretching. Later in pregnancy, you might be able to see and feel the baby moving from the outside.  You might notice that your baby is more active at certain times of the day or night. Even before birth, babies have periods of being asleep and awake. When your baby is sleeping, you might notice that they do not move as much.  Should I keep track of my baby's movements? -- If your pregnancy is healthy, you probably do not need to count or record your baby's movements. Feeling regular movement is a good sign that the baby is doing well.  In some cases, your doctor or midwife might ask you to keep track of your baby's movements. If so, they will tell you how to do this and when to call them.  A change in your baby's movements does not always mean that there is a problem. But in some cases, it can be a sign that the baby is having trouble. If your doctor or midwife is concerned, they can do tests to check on the baby.  If I am asked to track movement, how should I do it? -- There are different ways of tracking your baby's movement. This is sometimes called \"kick counting.\"  Your doctor or midwife will tell you exactly what to track. For example, they might ask you to write down:   How long it takes to feel 10 kicks or movements   How many times your baby moves " in 1 hour  Many experts consider at least 10 movements in 2 hours to be a sign that the baby is doing well. But there is no specific cutoff for exactly how much movement is healthy or unhealthy. Some babies are more active than others, and some pregnant people feel movement more easily than others. The main goal of kick counting is to get to know your baby's normal patterns so you can tell if anything changes.  If you are doing kick counting:   Choose a time of day when your baby is usually active.   Find a quiet place where you will not be distracted.   Lie down on your side in a comfortable position.   Check the clock, or set a timer.   Each time you feel your baby move or kick, write down the time. Some people use a smartphone carlos to keep track.   If your baby seems less active than usual, try moving around, eating a snack, and emptying your bladder. This can help wake the baby up if they are asleep.   Stop counting after you have felt 10 kicks, or after the length of time your doctor or midwife told you.  When should I call the doctor? -- Call your doctor or midwife for advice if:   You have concerns about your baby's movement.   Your baby is moving less than they normally do.   You notice a sudden change in the pattern of your baby's movements.   You have any other symptoms that worry you.  All topics are updated as new evidence becomes available and our peer review process is complete.  This topic retrieved from [employer] on: Feb 26, 2024.  Topic 354401 Version 1.0  Release: 32.2.4 - C32.56  © 2024 UpToDate, Inc. and/or its affiliates. All rights reserved.  Consumer Information Use and Disclaimer   Disclaimer: This generalized information is a limited summary of diagnosis, treatment, and/or medication information. It is not meant to be comprehensive and should be used as a tool to help the user understand and/or assess potential diagnostic and treatment options. It does NOT include all information about  "conditions, treatments, medications, side effects, or risks that may apply to a specific patient. It is not intended to be medical advice or a substitute for the medical advice, diagnosis, or treatment of a health care provider based on the health care provider's examination and assessment of a patient's specific and unique circumstances. Patients must speak with a health care provider for complete information about their health, medical questions, and treatment options, including any risks or benefits regarding use of medications. This information does not endorse any treatments or medications as safe, effective, or approved for treating a specific patient. UpToDate, Inc. and its affiliates disclaim any warranty or liability relating to this information or the use thereof.The use of this information is governed by the Terms of Use, available at https://www.MiniTime.com/en/know/clinical-effectiveness-terms. © UpToDate, Inc. and its affiliates and/or licensors. All rights reserved.  Copyright   ©  UpToDate, Inc. and/or its affiliates. All rights reserved.  Patient Education      labor   The Basics   Written by the doctors and editors at [employer]   What is  labor? -- \" labor\" is labor that starts before 37 weeks of pregnancy (3 or more weeks before the due date).  Pregnancy normally lasts about 40 weeks, counting from the first day of your last period. Going into labor before 37 weeks of pregnancy can be dangerous. This is because babies who are born , or \"premature,\" can have serious health problems.  What causes  labor? -- It is often hard to know why  labor happens. Some things that might cause  labor are:   Bleeding or other problems in the uterus   Being pregnant with twins, triplets, or more babies   Infection in the uterus or other part of the body  Who is at risk for  labor? -- It is usually not possible to tell who will go into labor early. " "Black people are more likely to give birth early than people of other ethnicities.  You are at highest risk of  labor if:   You had  labor and birth in the past.   You are pregnant with more than 1 baby (such as twins or triplets).   Your \"water breaks\" before 37 weeks of pregnancy.   You have an ultrasound showing that your cervix is short - The cervix is the bottom part of the uterus that leads to the vagina.   You have a problem called \"placental abruption\" - This is when the placenta separates from the uterus and causes vaginal bleeding. (The placenta is the organ that forms between you and your baby during pregnancy.)  Other things that might increase the risk of  labor are:   Using illegal drugs, such as cocaine   Smoking   Certain infections (including bladder and kidney infections)   Being underweight   Vaginal bleeding during early pregnancy   Having pregnancies close together   Previous surgery on the cervix   An abnormally shaped uterus  What are the symptoms of  labor? -- The symptoms of  labor are the same as with normal labor:   Tightening of the uterus, also called \"contractions\" - These can make your belly feel hard while they are happening. They eventually become painful.   Change in the fluid that comes out of your vagina - It might be watery, thick, or bloody.   Pain or pressure low in your belly or in your thighs   Pain in your lower back   Belly cramps, sometimes with diarrhea   Your water breaks - This can feel like just a trickle or a big gush of fluid from your vagina.  Some people have something called \"Craighead-Bravo contractions.\" These are contractions that happen several minutes apart. They are usually not too painful and don't get stronger or more frequent over time. They often go away when you lie down or rest. Craighead-Bravo contractions are sometimes called \"false labor contractions.\" That's because they don't really mean that you are going into " "labor.  Should I see a doctor or nurse? -- Yes. If you think that you might be in labor, call your doctor, nurse, or midwife. You should also call if you have:   Blood or fluid leaking from your vagina   More than 6 contractions in 1 hour (this means that the contractions are 10 minutes apart)  It is hard to know if you are actually in labor without being seen by a doctor or nurse.  Your doctor or nurse will be able to tell if you are in labor by examining your cervix and checking to see how often your contractions are happening. There are also tests that they can do to find out what is going on. It might take an hour or 2 to figure out whether you are in  labor.  Does  labor mean that my baby will be born early? -- Not always. Some people who have  labor end up having their baby at the normal time.  How is  labor treated? -- There are different treatments for  labor. The right treatment for you will depend on:   What is causing your labor   How far along you are in your pregnancy   How healthy you and your baby are  Depending on your situation, your doctor might give you medicines such as:   Medicines to try to stop or slow down your labor - These are called \"tocolytic drugs.\"   Steroids - You might get steroid medicines, especially if you are less than 34 weeks pregnant. These medicines will speed up the growth of your baby's lungs. This will help your baby breathe if they are born early.   Magnesium sulfate - You might get this medicine if you are less than 32 weeks pregnant. It can help prevent a brain disorder that could affect the baby called cerebral palsy.  Medicines used to stop  labor do not always work. Or they might work for a while, but then labor starts again. If you do give birth early, your health care team can take steps to protect the health of your baby.  Sometimes, the doctor or nurse will decide that it is better for the baby to be born early than to try " "to stop your labor. What is right for you will depend on your individual situation.  Can  labor be prevented? -- There is usually no way to prevent  labor. If you have a short cervix, your doctor might give you a hormone (called \"progesterone\") to lower the chance of  birth.  Pay attention to how you are feeling during pregnancy. Call your doctor, nurse, or midwife right away if you think that you might be in labor.  All topics are updated as new evidence becomes available and our peer review process is complete.  This topic retrieved from [employer] on: 2024.  Topic 13071 Version 16.0  Release: 32.2.4 - C32.58  2024 UpToDate, Inc. and/or its affiliates. All rights reserved.  Consumer Information Use and Disclaimer   Disclaimer: This generalized information is a limited summary of diagnosis, treatment, and/or medication information. It is not meant to be comprehensive and should be used as a tool to help the user understand and/or assess potential diagnostic and treatment options. It does NOT include all information about conditions, treatments, medications, side effects, or risks that may apply to a specific patient. It is not intended to be medical advice or a substitute for the medical advice, diagnosis, or treatment of a health care provider based on the health care provider's examination and assessment of a patient's specific and unique circumstances. Patients must speak with a health care provider for complete information about their health, medical questions, and treatment options, including any risks or benefits regarding use of medications. This information does not endorse any treatments or medications as safe, effective, or approved for treating a specific patient. UpToDate, Inc. and its affiliates disclaim any warranty or liability relating to this information or the use thereof.The use of this information is governed by the Terms of Use, available at " https://www.woltersThe IQ Collectiveuwer.com/en/know/clinical-effectiveness-terms. 2024© [employer], Inc. and its affiliates and/or licensors. All rights reserved.  Copyright   © 2024 [employer] and/or its affiliates. All rights reserved.

## 2024-11-14 NOTE — PROGRESS NOTES
40 yo  at 26w1d w gestation.  Late entry to care  AMA, grand multiparity, obesity, h/o GDM prior pregnancy.    PN labs not completed; no early glucola-- still not completed.  Stressed importance of having these done ASAP.  She reports that she will go this weekend.  Anatomy scan, 10/15/24: normal but incomplete; rpt US scheduled tomorrow.    Baby is active, denies leakage of fluid, vaginal bleeding or uterine contractions.  S=D, normal FHTs, normal BP    RV 2w

## 2024-11-15 ENCOUNTER — TELEPHONE (OUTPATIENT)
Age: 39
End: 2024-11-15

## 2024-11-15 PROBLEM — Z86.32 HISTORY OF GESTATIONAL DIABETES: Status: ACTIVE | Noted: 2024-11-15

## 2024-11-15 PROBLEM — O09.292 HISTORY OF GESTATIONAL DIABETES MELLITUS (GDM) IN PRIOR PREGNANCY, CURRENTLY PREGNANT IN SECOND TRIMESTER: Status: ACTIVE | Noted: 2024-11-15

## 2024-11-15 NOTE — TELEPHONE ENCOUNTER
Patient called to reschedule her 20wk detailed ultrasound redo appointment for today - There are no openings for a few weeks.  Patient is asking to be seen sooner.  Please advise.

## 2024-11-16 ENCOUNTER — APPOINTMENT (OUTPATIENT)
Dept: LAB | Age: 39
End: 2024-11-16
Payer: COMMERCIAL

## 2024-11-16 DIAGNOSIS — O09.523 AMA (ADVANCED MATERNAL AGE) MULTIGRAVIDA 35+, THIRD TRIMESTER: ICD-10-CM

## 2024-11-16 DIAGNOSIS — Z34.81 SUPERVISION OF NORMAL INTRAUTERINE PREGNANCY IN MULTIGRAVIDA IN FIRST TRIMESTER: ICD-10-CM

## 2024-11-16 DIAGNOSIS — Z87.59 HISTORY OF PREGNANCY INDUCED HYPERTENSION: ICD-10-CM

## 2024-11-16 DIAGNOSIS — O09.292 HISTORY OF GESTATIONAL DIABETES MELLITUS (GDM) IN PRIOR PREGNANCY, CURRENTLY PREGNANT IN SECOND TRIMESTER: ICD-10-CM

## 2024-11-16 DIAGNOSIS — Z86.32 HISTORY OF GESTATIONAL DIABETES MELLITUS (GDM) IN PRIOR PREGNANCY, CURRENTLY PREGNANT IN SECOND TRIMESTER: ICD-10-CM

## 2024-11-16 LAB
ALBUMIN SERPL BCG-MCNC: 3.4 G/DL (ref 3.5–5)
ALP SERPL-CCNC: 65 U/L (ref 34–104)
ALT SERPL W P-5'-P-CCNC: 26 U/L (ref 7–52)
ANION GAP SERPL CALCULATED.3IONS-SCNC: 7 MMOL/L (ref 4–13)
AST SERPL W P-5'-P-CCNC: 26 U/L (ref 13–39)
BACTERIA UR QL AUTO: ABNORMAL /HPF
BASOPHILS # BLD AUTO: 0.01 THOUSANDS/ÂΜL (ref 0–0.1)
BASOPHILS NFR BLD AUTO: 0 % (ref 0–1)
BILIRUB SERPL-MCNC: 0.3 MG/DL (ref 0.2–1)
BILIRUB UR QL STRIP: NEGATIVE
BUN SERPL-MCNC: 6 MG/DL (ref 5–25)
CALCIUM ALBUM COR SERPL-MCNC: 9 MG/DL (ref 8.3–10.1)
CALCIUM SERPL-MCNC: 8.5 MG/DL (ref 8.4–10.2)
CHLORIDE SERPL-SCNC: 103 MMOL/L (ref 96–108)
CLARITY UR: ABNORMAL
CO2 SERPL-SCNC: 24 MMOL/L (ref 21–32)
COLOR UR: YELLOW
CREAT SERPL-MCNC: 0.44 MG/DL (ref 0.6–1.3)
CREAT UR-MCNC: 210 MG/DL
EOSINOPHIL # BLD AUTO: 0.04 THOUSAND/ÂΜL (ref 0–0.61)
EOSINOPHIL NFR BLD AUTO: 1 % (ref 0–6)
ERYTHROCYTE [DISTWIDTH] IN BLOOD BY AUTOMATED COUNT: 12.7 % (ref 11.6–15.1)
FERRITIN SERPL-MCNC: 12 NG/ML (ref 11–307)
GFR SERPL CREATININE-BSD FRML MDRD: 128 ML/MIN/1.73SQ M
GLUCOSE 1H P 50 G GLC PO SERPL-MCNC: 146 MG/DL (ref 70–134)
GLUCOSE SERPL-MCNC: 147 MG/DL (ref 65–140)
GLUCOSE UR STRIP-MCNC: NEGATIVE MG/DL
HCT VFR BLD AUTO: 32 % (ref 34.8–46.1)
HGB BLD-MCNC: 10.3 G/DL (ref 11.5–15.4)
HGB UR QL STRIP.AUTO: NEGATIVE
IMM GRANULOCYTES # BLD AUTO: 0.03 THOUSAND/UL (ref 0–0.2)
IMM GRANULOCYTES NFR BLD AUTO: 0 % (ref 0–2)
KETONES UR STRIP-MCNC: NEGATIVE MG/DL
LEUKOCYTE ESTERASE UR QL STRIP: NEGATIVE
LYMPHOCYTES # BLD AUTO: 1.42 THOUSANDS/ÂΜL (ref 0.6–4.47)
LYMPHOCYTES NFR BLD AUTO: 16 % (ref 14–44)
MCH RBC QN AUTO: 30.9 PG (ref 26.8–34.3)
MCHC RBC AUTO-ENTMCNC: 32.2 G/DL (ref 31.4–37.4)
MCV RBC AUTO: 96 FL (ref 82–98)
MONOCYTES # BLD AUTO: 0.45 THOUSAND/ÂΜL (ref 0.17–1.22)
MONOCYTES NFR BLD AUTO: 5 % (ref 4–12)
MUCOUS THREADS UR QL AUTO: ABNORMAL
NEUTROPHILS # BLD AUTO: 6.93 THOUSANDS/ÂΜL (ref 1.85–7.62)
NEUTS SEG NFR BLD AUTO: 78 % (ref 43–75)
NITRITE UR QL STRIP: NEGATIVE
NON-SQ EPI CELLS URNS QL MICRO: ABNORMAL /HPF
NRBC BLD AUTO-RTO: 0 /100 WBCS
PH UR STRIP.AUTO: 6 [PH]
PLATELET # BLD AUTO: 189 THOUSANDS/UL (ref 149–390)
PMV BLD AUTO: 12.5 FL (ref 8.9–12.7)
POTASSIUM SERPL-SCNC: 4.1 MMOL/L (ref 3.5–5.3)
PROT SERPL-MCNC: 6.3 G/DL (ref 6.4–8.4)
PROT UR STRIP-MCNC: ABNORMAL MG/DL
PROT UR-MCNC: 21.4 MG/DL
PROT/CREAT UR: 0.1 MG/G{CREAT} (ref 0–0.1)
RBC # BLD AUTO: 3.33 MILLION/UL (ref 3.81–5.12)
RBC #/AREA URNS AUTO: ABNORMAL /HPF
RUBV IGG SERPL IA-ACNC: 38.3 IU/ML
SODIUM SERPL-SCNC: 134 MMOL/L (ref 135–147)
SP GR UR STRIP.AUTO: 1.02 (ref 1–1.03)
URATE SERPL-MCNC: 4.1 MG/DL (ref 2–7.5)
UROBILINOGEN UR STRIP-ACNC: <2 MG/DL
WBC # BLD AUTO: 8.88 THOUSAND/UL (ref 4.31–10.16)
WBC #/AREA URNS AUTO: ABNORMAL /HPF

## 2024-11-16 PROCEDURE — 86803 HEPATITIS C AB TEST: CPT

## 2024-11-16 PROCEDURE — 86780 TREPONEMA PALLIDUM: CPT

## 2024-11-16 PROCEDURE — 87389 HIV-1 AG W/HIV-1&-2 AB AG IA: CPT

## 2024-11-16 PROCEDURE — 86706 HEP B SURFACE ANTIBODY: CPT

## 2024-11-16 PROCEDURE — 82728 ASSAY OF FERRITIN: CPT

## 2024-11-16 PROCEDURE — 84550 ASSAY OF BLOOD/URIC ACID: CPT

## 2024-11-16 PROCEDURE — 87086 URINE CULTURE/COLONY COUNT: CPT

## 2024-11-16 PROCEDURE — 82950 GLUCOSE TEST: CPT

## 2024-11-16 PROCEDURE — 36415 COLL VENOUS BLD VENIPUNCTURE: CPT

## 2024-11-16 PROCEDURE — 87340 HEPATITIS B SURFACE AG IA: CPT

## 2024-11-16 PROCEDURE — 86762 RUBELLA ANTIBODY: CPT

## 2024-11-16 PROCEDURE — 83020 HEMOGLOBIN ELECTROPHORESIS: CPT

## 2024-11-16 PROCEDURE — 82570 ASSAY OF URINE CREATININE: CPT

## 2024-11-16 PROCEDURE — 80053 COMPREHEN METABOLIC PANEL: CPT

## 2024-11-16 PROCEDURE — 85025 COMPLETE CBC W/AUTO DIFF WBC: CPT

## 2024-11-16 PROCEDURE — 84156 ASSAY OF PROTEIN URINE: CPT

## 2024-11-17 LAB
HBV SURFACE AB SER-ACNC: 18.7 MIU/ML
HBV SURFACE AG SER QL: NORMAL
HCV AB SER QL: NORMAL
HIV 1+2 AB+HIV1 P24 AG SERPL QL IA: NORMAL
HIV 2 AB SERPL QL IA: NORMAL
HIV1 AB SERPL QL IA: NORMAL
HIV1 P24 AG SERPL QL IA: NORMAL
TREPONEMA PALLIDUM IGG+IGM AB [PRESENCE] IN SERUM OR PLASMA BY IMMUNOASSAY: NORMAL

## 2024-11-18 ENCOUNTER — RESULTS FOLLOW-UP (OUTPATIENT)
Dept: OBGYN CLINIC | Facility: CLINIC | Age: 39
End: 2024-11-18

## 2024-11-18 ENCOUNTER — TELEPHONE (OUTPATIENT)
Age: 39
End: 2024-11-18

## 2024-11-18 DIAGNOSIS — R73.09 ELEVATED GLUCOSE TOLERANCE TEST: Primary | ICD-10-CM

## 2024-11-18 DIAGNOSIS — O99.013 ANEMIA DURING PREGNANCY IN THIRD TRIMESTER: ICD-10-CM

## 2024-11-18 LAB — BACTERIA UR CULT: NORMAL

## 2024-11-18 RX ORDER — FERROUS SULFATE 324(65)MG
324 TABLET, DELAYED RELEASE (ENTERIC COATED) ORAL
Qty: 30 TABLET | Refills: 3 | Status: SHIPPED | OUTPATIENT
Start: 2024-11-18

## 2024-11-18 NOTE — TELEPHONE ENCOUNTER
Pt returned call. States she has a question about the Fresh test. States that is how she did the one hour test at [facility]. Wondering if she can get the 100 g test and take it to the lab. Advised pt to follow up with [facility] to confirm. Pt verbalized understanding and is thankful.

## 2024-11-18 NOTE — TELEPHONE ENCOUNTER
Patient had returned call. Attempted to transfer to [facility] but call had dropped. Patient can be reached at [phone].

## 2024-11-18 NOTE — TELEPHONE ENCOUNTER
Pt calling in regarding results. Pt was notified of Dr Henderson's recommendations, pt verbalized understanding. Pt would like further clarification on the urine testing as pt stating that she sees abnormal results and is concerned. Pt would also like to know if she can do the fresh test for her 3 hours glucose as she did it with her first glucose testing. Please give further recommendations

## 2024-11-18 NOTE — TELEPHONE ENCOUNTER
Pt called in regarding her results from this past weekends blood work. Advised MD has not yet reviewed. Pt requesting call back once reviewed.

## 2024-11-19 RX ORDER — FERRIC GLYCINATE 18 MG/15ML
10 LIQUID (ML) ORAL DAILY
COMMUNITY

## 2024-11-21 LAB
HGB A MFR BLD: 2.7 % (ref 1.8–3.2)
HGB A MFR BLD: 97.3 % (ref 96.4–98.8)
HGB F MFR BLD: 0 % (ref 0–2)
HGB FRACT BLD-IMP: NORMAL
HGB S MFR BLD: 0 %

## 2024-11-29 ENCOUNTER — ROUTINE PRENATAL (OUTPATIENT)
Dept: OBGYN CLINIC | Facility: CLINIC | Age: 39
End: 2024-11-29

## 2024-11-29 VITALS
HEIGHT: 66 IN | HEART RATE: 81 BPM | BODY MASS INDEX: 39.47 KG/M2 | DIASTOLIC BLOOD PRESSURE: 66 MMHG | WEIGHT: 245.6 LBS | SYSTOLIC BLOOD PRESSURE: 102 MMHG

## 2024-11-29 DIAGNOSIS — O09.522 AMA (ADVANCED MATERNAL AGE) MULTIGRAVIDA 35+, SECOND TRIMESTER: ICD-10-CM

## 2024-11-29 DIAGNOSIS — O09.93 ENCOUNTER FOR SUPERVISION OF HIGH RISK PREGNANCY IN THIRD TRIMESTER, ANTEPARTUM: Primary | ICD-10-CM

## 2024-11-29 DIAGNOSIS — Z3A.28 28 WEEKS GESTATION OF PREGNANCY: ICD-10-CM

## 2024-11-29 DIAGNOSIS — O09.42 GRAND MULTIPARITY WITH CURRENT PREGNANCY IN SECOND TRIMESTER: ICD-10-CM

## 2024-11-29 DIAGNOSIS — O09.32 INSUFFICIENT PRENATAL CARE IN SECOND TRIMESTER: ICD-10-CM

## 2024-11-29 PROCEDURE — PNV: Performed by: OBSTETRICS & GYNECOLOGY

## 2024-11-29 NOTE — PROGRESS NOTES
"Assessment & Plan  39 y.o.  at 28w2d presenting for routine prenatal visit.     Problem List       Tension type headache    28 weeks gestation of pregnancy    Overview   1 hour GTT elevated, needs to complete 3 hour  [ ]Flu vaccine- declines, [ ] TDAP vaccine   [ ]Delivery consent  [ ]Contraception             Grand multiparity with current pregnancy in second trimester    AMA (advanced maternal age) multigravida 35+, second trimester    Insufficient prenatal care in second trimester    Obesity during pregnancy in second trimester    History of gestational diabetes mellitus (GDM) in prior pregnancy, currently pregnant in second trimester     Other Visit Diagnoses         Encounter for supervision of high risk pregnancy in third trimester, antepartum    -  Primary          ____________________________________________________________  Subjective  She is without complaint.   She denies contractions, loss of fluid, or vaginal bleeding.   She feels regular fetal movements.     Objective  /66 (BP Location: Right arm, Cuff Size: Large)   Pulse 81   Ht 5' 6\" (1.676 m)   Wt 111 kg (245 lb 9.6 oz)   LMP 05/15/2024 (Exact Date)   BMI 39.64 kg/m²   FHR: 140 bpm  Fundal height 26 cm    Physical Exam  Constitutional:       Appearance: She is well-developed.   Cardiovascular:      Rate and Rhythm: Normal rate.   Pulmonary:      Effort: Pulmonary effort is normal. No respiratory distress.   Abdominal:      Palpations: Abdomen is soft.      Tenderness: There is no abdominal tenderness.   Skin:     General: Skin is warm and dry.          Patient's Active Problem List  Patient Active Problem List   Diagnosis    Tension type headache    28 weeks gestation of pregnancy    Grand multiparity with current pregnancy in second trimester    AMA (advanced maternal age) multigravida 35+, second trimester    Insufficient prenatal care in second trimester    Obesity during pregnancy in second trimester    History of gestational " diabetes mellitus (GDM) in prior pregnancy, currently pregnant in second trimester           Linda Henderson MD  11/29/2024  3:15 PM

## 2024-12-05 PROBLEM — O99.213 SEVERE OBESITY DUE TO EXCESS CALORIES AFFECTING PREGNANCY IN THIRD TRIMESTER (HCC): Status: ACTIVE | Noted: 2024-11-14

## 2024-12-05 PROBLEM — O09.293 HX OF GESTATIONAL DIABETES IN PRIOR PREGNANCY, CURRENTLY PREGNANT, THIRD TRIMESTER: Status: ACTIVE | Noted: 2024-11-15

## 2024-12-05 PROBLEM — E66.01 SEVERE OBESITY DUE TO EXCESS CALORIES AFFECTING PREGNANCY IN THIRD TRIMESTER (HCC): Status: ACTIVE | Noted: 2024-11-14

## 2024-12-06 ENCOUNTER — ULTRASOUND (OUTPATIENT)
Dept: PERINATAL CARE | Facility: OTHER | Age: 39
End: 2024-12-06
Payer: COMMERCIAL

## 2024-12-06 VITALS
WEIGHT: 246.4 LBS | BODY MASS INDEX: 39.6 KG/M2 | HEIGHT: 66 IN | DIASTOLIC BLOOD PRESSURE: 64 MMHG | HEART RATE: 94 BPM | SYSTOLIC BLOOD PRESSURE: 126 MMHG

## 2024-12-06 DIAGNOSIS — O09.522 AMA (ADVANCED MATERNAL AGE) MULTIGRAVIDA 35+, SECOND TRIMESTER: Primary | ICD-10-CM

## 2024-12-06 DIAGNOSIS — Z86.32 HX OF GESTATIONAL DIABETES IN PRIOR PREGNANCY, CURRENTLY PREGNANT, THIRD TRIMESTER: ICD-10-CM

## 2024-12-06 DIAGNOSIS — Z36.89 ENCOUNTER FOR ULTRASOUND TO ASSESS FETAL GROWTH: ICD-10-CM

## 2024-12-06 DIAGNOSIS — Z3A.29 29 WEEKS GESTATION OF PREGNANCY: ICD-10-CM

## 2024-12-06 DIAGNOSIS — O99.213 SEVERE OBESITY DUE TO EXCESS CALORIES AFFECTING PREGNANCY IN THIRD TRIMESTER (HCC): ICD-10-CM

## 2024-12-06 DIAGNOSIS — O09.293 HX OF GESTATIONAL DIABETES IN PRIOR PREGNANCY, CURRENTLY PREGNANT, THIRD TRIMESTER: ICD-10-CM

## 2024-12-06 DIAGNOSIS — Z36.2 ENCOUNTER FOR FOLLOW-UP ULTRASOUND OF FETAL ANATOMY: ICD-10-CM

## 2024-12-06 DIAGNOSIS — E66.01 SEVERE OBESITY DUE TO EXCESS CALORIES AFFECTING PREGNANCY IN THIRD TRIMESTER (HCC): ICD-10-CM

## 2024-12-06 PROCEDURE — 76816 OB US FOLLOW-UP PER FETUS: CPT | Performed by: OBSTETRICS & GYNECOLOGY

## 2024-12-07 NOTE — PROGRESS NOTES
This patient received  care under my supervision at [facility].  The note is contained in the ultrasound report located under OB Procedures tab in Epic.  Please call our office at [phone] with questions.  -Elodia Baugh MD

## 2024-12-17 ENCOUNTER — TELEPHONE (OUTPATIENT)
Dept: OBGYN CLINIC | Facility: CLINIC | Age: 39
End: 2024-12-17

## 2024-12-17 ENCOUNTER — TELEPHONE (OUTPATIENT)
Age: 39
End: 2024-12-17

## 2024-12-17 ENCOUNTER — APPOINTMENT (OUTPATIENT)
Dept: LAB | Facility: HOSPITAL | Age: 39
End: 2024-12-17
Payer: COMMERCIAL

## 2024-12-17 DIAGNOSIS — R73.09 ELEVATED GLUCOSE TOLERANCE TEST: ICD-10-CM

## 2024-12-17 LAB
GLUCOSE 1H P 100 G GLC PO SERPL-MCNC: 187 MG/DL (ref 65–179)
GLUCOSE 2H P 100 G GLC PO SERPL-MCNC: 136 MG/DL (ref 65–154)
GLUCOSE 3H P 100 G GLC PO SERPL-MCNC: 98 MG/DL (ref 65–139)
GLUCOSE P FAST SERPL-MCNC: 92 MG/DL (ref 65–94)

## 2024-12-17 PROCEDURE — 82952 GTT-ADDED SAMPLES: CPT

## 2024-12-17 PROCEDURE — 36415 COLL VENOUS BLD VENIPUNCTURE: CPT

## 2024-12-17 PROCEDURE — 82951 GLUCOSE TOLERANCE TEST (GTT): CPT

## 2024-12-17 NOTE — TELEPHONE ENCOUNTER
Please advise the patient that 1 of 4 values of her 3 hour gtt are abnormal. She does not have GDM

## 2024-12-17 NOTE — TELEPHONE ENCOUNTER
Patient returned call. Ob navigator is with a pt at time of encounter, please reach out to pt.   Pt also inquiring about 3 hr GTT results, aware provider has not interpreted yet as results were recently released. Pt disconnected call.

## 2024-12-17 NOTE — TELEPHONE ENCOUNTER
Overall how are you feeling? Patient states she is doing well.    Compliant with routine OB appointments? Yes  Have you completed your 3rd trimester lab work? Yes, had 3 hour GTT done today.  Results have not yet been reviewed by provider.  Patient aware she will be notified once reviewed.    Have you reviewed the contents of 3rd trimester folder from office?  No, patient will receive folder at next prenatal appointment and contents will be reviewed at that time.   Have you decided on a pediatrician? Yes     If yes, who:  [facility]    If no, what are you looking for and request sent for outreach.  Questions on paperwork to go back to office? Patient will receive folder and forms at next prenatal appointment.     Questions on the baby birth certificate forms? Patient will receive folder and forms at next prenatal appointment.      Sent link for the Hospital Readiness Video via [employer]

## 2024-12-17 NOTE — TELEPHONE ENCOUNTER
Bedside report given to César Blackmon RN. Nest cleaned. Patient asking about the results of her glucose testing, made aware not yet reviewed by provider.  Patient states she had gestational diabetes with pregnancy 5 years ago. We reviewed if she would have abnormal results then new referral for D&P and she would still need to complete D&P classes. Patient verbalzied understanding.  InBasket message sent to OB Navigator.

## 2024-12-18 ENCOUNTER — RESULTS FOLLOW-UP (OUTPATIENT)
Dept: OBGYN CLINIC | Facility: CLINIC | Age: 39
End: 2024-12-18

## 2024-12-18 ENCOUNTER — TELEPHONE (OUTPATIENT)
Age: 39
End: 2024-12-18

## 2024-12-18 NOTE — TELEPHONE ENCOUNTER
Patient asking if she can take Wonder Belly instead of tums, as this is a  vegan version. Patient hoping for response today. Informed will send to on-call provider for review.

## 2024-12-18 NOTE — TELEPHONE ENCOUNTER
31 weeks 0 d, EDC 2/19/24. Patient having heartburn symptoms. Patient states she has a few weeks of symptoms. Patient states she has not tried anything other than milk. Not feeling heartburn all the time, depends on what she eats. Denies n/v/d. No abd pain or contractions. Denies issues with pregnancy, feeling good fetal movements.Reviewed bland diet and discussed list of the OTC meds she can take: Mylanta, Maalox, Tums, Pepcid, Prilosec. Patient states she will try.   Advise to callback if OTC not effective, any n/v/d, or any other concerning symptoms. Patient verbalzied understanding.   ESC Dr. Neff- agree with your recommendation

## 2024-12-20 ENCOUNTER — TELEPHONE (OUTPATIENT)
Dept: OBGYN CLINIC | Facility: CLINIC | Age: 39
End: 2024-12-20

## 2024-12-20 NOTE — TELEPHONE ENCOUNTER
Called and spoke with patient regarding missed appointment on 12/20/24. Patient was unable to reschedule appointment and would like to just follow up with her next appointment on 12/31/24.

## 2025-01-03 ENCOUNTER — ROUTINE PRENATAL (OUTPATIENT)
Dept: OBGYN CLINIC | Facility: CLINIC | Age: 40
End: 2025-01-03

## 2025-01-03 VITALS
DIASTOLIC BLOOD PRESSURE: 74 MMHG | HEIGHT: 66 IN | WEIGHT: 244.2 LBS | BODY MASS INDEX: 39.25 KG/M2 | SYSTOLIC BLOOD PRESSURE: 112 MMHG | HEART RATE: 87 BPM

## 2025-01-03 DIAGNOSIS — O09.293 HX OF GESTATIONAL DIABETES IN PRIOR PREGNANCY, CURRENTLY PREGNANT, THIRD TRIMESTER: ICD-10-CM

## 2025-01-03 DIAGNOSIS — Z86.32 HX OF GESTATIONAL DIABETES IN PRIOR PREGNANCY, CURRENTLY PREGNANT, THIRD TRIMESTER: ICD-10-CM

## 2025-01-03 DIAGNOSIS — O09.93 ENCOUNTER FOR SUPERVISION OF HIGH RISK PREGNANCY IN THIRD TRIMESTER, ANTEPARTUM: Primary | ICD-10-CM

## 2025-01-03 DIAGNOSIS — Z3A.33 33 WEEKS GESTATION OF PREGNANCY: ICD-10-CM

## 2025-01-03 DIAGNOSIS — O09.33 INSUFFICIENT PRENATAL CARE IN THIRD TRIMESTER: ICD-10-CM

## 2025-01-03 DIAGNOSIS — O09.43 GRAND MULTIPARITY WITH CURRENT PREGNANCY IN THIRD TRIMESTER: ICD-10-CM

## 2025-01-03 DIAGNOSIS — O09.522 AMA (ADVANCED MATERNAL AGE) MULTIGRAVIDA 35+, SECOND TRIMESTER: ICD-10-CM

## 2025-01-03 PROCEDURE — PNV: Performed by: OBSTETRICS & GYNECOLOGY

## 2025-01-03 NOTE — PROGRESS NOTES
"Assessment & Plan  39 y.o.  at 33w2d presenting for routine prenatal visit.     Problem List       Tension type headache    33 weeks gestation of pregnancy    Overview   1 hour GTT elevated, passed 3 hour  [ ]Flu vaccine- declines, [ ] TDAP vaccine - declines  [x]Delivery consent  [ ]Contraception - undecided, may want tubal ligation  F/u repeat growth US at 37 weeks, begin APFS at 37 w [ ]             Grand multiparity with current pregnancy in third trimester    AMA (advanced maternal age) multigravida 35+, second trimester    Insufficient prenatal care in third trimester    Severe obesity due to excess calories affecting pregnancy in third trimester (HCC)    Hx of gestational diabetes in prior pregnancy, currently pregnant, third trimester     Other Visit Diagnoses         Encounter for supervision of high risk pregnancy in third trimester, antepartum    -  Primary          ____________________________________________________________  Subjective  She is without complaint.   She denies contractions, loss of fluid, or vaginal bleeding.   She feels regular fetal movements.     Objective  /74 (BP Location: Right arm, Cuff Size: Large)   Pulse 87   Ht 5' 6\" (1.676 m)   Wt 111 kg (244 lb 3.2 oz)   LMP 05/15/2024 (Exact Date)   BMI 39.41 kg/m²   FHR: 145 bpm  Fundal height 35 cm    Physical Exam  Constitutional:       Appearance: She is well-developed.   Cardiovascular:      Rate and Rhythm: Normal rate.   Pulmonary:      Effort: Pulmonary effort is normal. No respiratory distress.   Abdominal:      Palpations: Abdomen is soft.      Tenderness: There is no abdominal tenderness.   Skin:     General: Skin is warm and dry.          Patient's Active Problem List  Patient Active Problem List   Diagnosis    Tension type headache    33 weeks gestation of pregnancy    Grand multiparity with current pregnancy in third trimester    AMA (advanced maternal age) multigravida 35+, second trimester    Insufficient " prenatal care in third trimester    Severe obesity due to excess calories affecting pregnancy in third trimester (HCC)    Hx of gestational diabetes in prior pregnancy, currently pregnant, third trimester           Linda Henderson MD  1/3/2025  8:27 AM

## 2025-01-23 ENCOUNTER — ROUTINE PRENATAL (OUTPATIENT)
Dept: OBGYN CLINIC | Facility: CLINIC | Age: 40
End: 2025-01-23

## 2025-01-23 VITALS
DIASTOLIC BLOOD PRESSURE: 74 MMHG | HEART RATE: 99 BPM | WEIGHT: 240 LBS | SYSTOLIC BLOOD PRESSURE: 122 MMHG | BODY MASS INDEX: 38.57 KG/M2 | OXYGEN SATURATION: 100 % | HEIGHT: 66 IN

## 2025-01-23 DIAGNOSIS — O09.93 ENCOUNTER FOR SUPERVISION OF HIGH RISK PREGNANCY IN THIRD TRIMESTER, ANTEPARTUM: Primary | ICD-10-CM

## 2025-01-23 PROCEDURE — PNV: Performed by: NURSE PRACTITIONER

## 2025-01-23 PROCEDURE — 87150 DNA/RNA AMPLIFIED PROBE: CPT | Performed by: NURSE PRACTITIONER

## 2025-01-23 NOTE — PROGRESS NOTES
40 yo  at 36w1d  gestation.  Late entry to care  AMA, grand multiparity, obesity, h/o GDM prior pregnancy.  Iron supplement  3-hour glucose tolerance with 1 abnormal value.    Declined TDAP  Consent signed    Irregular contractions and a lot of pressure.  Very active.  No LOF/VB.  37w growth scan- has appt 25, then weekly APFS.    GBS collected.  Awaiting spontaneous labor.  RV one week

## 2025-01-25 LAB — GP B STREP DNA SPEC QL NAA+PROBE: NEGATIVE

## 2025-01-26 ENCOUNTER — CLINICAL SUPPORT (OUTPATIENT)
Age: 40
End: 2025-01-26

## 2025-01-26 DIAGNOSIS — Z32.2 ENCOUNTER FOR CHILDBIRTH INSTRUCTION: Primary | ICD-10-CM

## 2025-01-27 ENCOUNTER — RESULTS FOLLOW-UP (OUTPATIENT)
Dept: OBGYN CLINIC | Facility: CLINIC | Age: 40
End: 2025-01-27

## 2025-01-30 ENCOUNTER — HOSPITAL ENCOUNTER (OUTPATIENT)
Facility: HOSPITAL | Age: 40
Discharge: HOME | End: 2025-01-30
Attending: OBSTETRICS & GYNECOLOGY | Admitting: OBSTETRICS & GYNECOLOGY
Payer: COMMERCIAL

## 2025-01-30 ENCOUNTER — NURSE TRIAGE (OUTPATIENT)
Dept: OTHER | Facility: OTHER | Age: 40
End: 2025-01-30

## 2025-01-30 ENCOUNTER — TELEPHONE (OUTPATIENT)
Age: 40
End: 2025-01-30

## 2025-01-30 VITALS
DIASTOLIC BLOOD PRESSURE: 80 MMHG | SYSTOLIC BLOOD PRESSURE: 132 MMHG | HEART RATE: 80 BPM | TEMPERATURE: 98.8 F | RESPIRATION RATE: 18 BRPM

## 2025-01-30 DIAGNOSIS — O99.891 BACK PAIN AFFECTING PREGNANCY IN THIRD TRIMESTER: Primary | ICD-10-CM

## 2025-01-30 DIAGNOSIS — M54.9 BACK PAIN AFFECTING PREGNANCY IN THIRD TRIMESTER: Primary | ICD-10-CM

## 2025-01-30 PROBLEM — O47.9 BRAXTON HICKS CONTRACTIONS: Status: ACTIVE | Noted: 2025-01-30

## 2025-01-30 PROBLEM — Z3A.33 33 WEEKS GESTATION OF PREGNANCY: Status: RESOLVED | Noted: 2024-08-13 | Resolved: 2025-01-30

## 2025-01-30 PROBLEM — O09.523 AMA (ADVANCED MATERNAL AGE) MULTIGRAVIDA 35+, THIRD TRIMESTER: Status: ACTIVE | Noted: 2024-09-10

## 2025-01-30 PROCEDURE — 99212 OFFICE O/P EST SF 10 MIN: CPT

## 2025-01-30 PROCEDURE — NC001 PR NO CHARGE: Performed by: OBSTETRICS & GYNECOLOGY

## 2025-01-30 RX ORDER — METHOCARBAMOL 500 MG/1
500 TABLET, FILM COATED ORAL ONCE
Status: COMPLETED | OUTPATIENT
Start: 2025-01-30 | End: 2025-01-30

## 2025-01-30 RX ORDER — METHOCARBAMOL 500 MG/1
500 TABLET, FILM COATED ORAL 4 TIMES DAILY
Qty: 15 TABLET | Refills: 0 | Status: SHIPPED | OUTPATIENT
Start: 2025-01-30 | End: 2025-02-03

## 2025-01-30 RX ORDER — LIDOCAINE 50 MG/G
1 PATCH TOPICAL DAILY
Status: DISCONTINUED | OUTPATIENT
Start: 2025-01-30 | End: 2025-01-30 | Stop reason: HOSPADM

## 2025-01-30 RX ADMIN — LIDOCAINE 1 PATCH: 700 PATCH TOPICAL at 03:06

## 2025-01-30 RX ADMIN — METHOCARBAMOL 500 MG: 500 TABLET ORAL at 03:11

## 2025-01-30 NOTE — TELEPHONE ENCOUNTER
"Reason for Disposition   [1] Pregnant 23 or more weeks AND [2] baby is moving less today (e.g., kick count < 5 in 1 hour or < 10 in 2 hours)    Answer Assessment - Initial Assessment Questions  1. ONSET: \"When did the pain begin?\"       3 hours ago    2. LOCATION: \"Where does it hurt?\" (upper, mid or lower back)      Right lower back     3. SEVERITY: \"How bad is the pain?\"  (e.g., Scale 1-10; mild, moderate, or severe)      8-9/10    4. PATTERN: \"Is the pain constant?\" (e.g., yes, no; constant, intermittent)       Constant    5. RADIATION: \"Does the pain shoot into your legs or somewhere else?\"      Denies    6. CAUSE:  \"What do you think is causing the back pain?\"       Unknown     7. BACK OVERUSE:  \"Any recent lifting of heavy objects, strenuous work or exercise?\"      Denies    8. MEDICINES: \"What have you taken so far for the pain?\" (e.g., nothing, acetaminophen)      Nothing    9. NEUROLOGIC SYMPTOMS: \"Do you have any weakness, numbness, or problems with bowel/bladder control?\"      Denies    10. OTHER SYMPTOMS: \"Do you have any other symptoms?\" (e.g., abdomen pain, blood in urine, fever, fluid leaking from vagina, pain with urination)        Nausea, abdominal cramping    11. VASILE: \"What date are you expecting to deliver?\"        2/19/25    Denies any vaginal bleeding or leakage of fluid. Fetal movements are decreased.    Protocols used: Pregnancy - Back Pain-Adult-AH    "

## 2025-01-30 NOTE — TELEPHONE ENCOUNTER
Pt called says she has an appt tomorrow at 7AM and wants to know if it can be pushed back to a later time due to expected freezing rain in the morning she is 37 weeks and does not want to miss appt but is concerned about weather conditions. Pt stated she was also seen in ED last night and wanted to further discuss with provider about ED visit. Please call pt back as soon as possible due to appt being so early in the morning.

## 2025-01-30 NOTE — TELEPHONE ENCOUNTER
On call provider notified with patients symptoms. On call provider stated patient should be seen at Labor and Delivery. Patient notified and verbalized understanding and has no further questions.

## 2025-01-30 NOTE — TELEPHONE ENCOUNTER
Pt called says she has an appt tomorrow at 8:45AM and wants to know if it can be pushed back to a later time due to expected freezing rain in the morning she is 37 weeks and does not want to miss appt but is concerned about weather conditions. Pt stated she was also seen in ED last night and wanted to further discuss with provider about ED visit. Please call pt back as soon as possible due to appt being so early in the morning. Pt also has an appt with regular OB in a different office sched before [facility].

## 2025-01-30 NOTE — PROGRESS NOTES
L&D Triage Note - OB/GYN  Radha Fonseca 39 y.o. female MRN: [id number]  Unit/Bed#:  307-01 Encounter: [id number]      ASSESSMENT and PLAN:    Radha Fonseca is a 39 y.o.  at 37w1d who was evaluated today in triage for back pain and celso cornejo contractions. SVE 0.5/50/-3 with irregular contractions on tocometer. The patient does not appear to be in labor and it is safe to discharge home. Pain likely MSK and improved with robaxin and lidocaine patch. Initial /76 while patient was in pain. No other elevated BP this pregnancy. Repeat 132/80 when pain improved. Next prenatal appointment 25.     37 weeks pregnant - Continue routine prenatal care  - Encouraged PO hydration, warm bath/shower, reassurance  - Discharge from OB triage with labor precautions    - Reviewed rupture of membranes, false vs true labor, decreased fetal movement, and vaginal bleeding   - Pt to call provider with any concerns and follow up at her next scheduled prenatal appointment    - Case discussed with Dr. Toussaint    SUBJECTIVE:    Radha Fonseca 39 y.o.  at 37w1d with an Estimated Date of Delivery: 25 presenting with back pain that started yesterday and has worsened tonight. It is located in her right buttock and is constant. She denies recent falls or trauma. She denies dysuria or hematuria. She feels well otherwise. She feels some mild irregular period-like cramps. She has had 5 prior vaginal deliveries and states this does not feel like labor. She denies HA, vision changes, CP, SOB, new sudden swelling, RUQ pain.       Contractions: irregular, mild cramps  Leakage of fluid: Denies  Vaginal Bleeding: Denies  Fetal movement: present    OBJECTIVE:    Vitals:    25 0347   BP: 132/80   Pulse:    Resp:    Temp:        ROS:  Constitutional: Negative  Respiratory: Negative  Cardiovascular: Negative    Gastrointestinal: Negative    General Physical Exam:  General: Well appearing, no  distress  Respiratory: Unlabored breathing  Cardiovascular: Regular rate.  Abdomen: Soft, gravid, nontender  Fundal Height: Appropriate for gestational age.  Extremities: Warm and well perfused.  Non tender.      Fetal monitoring:  Fetal heart rate: Baseline Rate (FHR): 140 bpm  Variability: Moderate  Accelerations: 15 x 15 or greater, At variable times  [place]: Contraction Frequency (minutes): irregular  Contraction Duration (seconds):   Contraction Intensity: Mild      Cervical Exam  SVE: 0.5/50/-3        Monse Fu MD  1/30/2025  3:48 AM

## 2025-01-31 ENCOUNTER — TELEPHONE (OUTPATIENT)
Dept: OBGYN CLINIC | Facility: CLINIC | Age: 40
End: 2025-01-31

## 2025-01-31 NOTE — TELEPHONE ENCOUNTER
Called patient back. There is nothing available later for [place] location, pt was offered [address] but declined. Patient stated she talked to someone this morning to reschedule ultrasound appt, I did make patient aware that is for a different office/ appointment and she would need to  reschedule 37 week check up. Pt still declined later appointment for [address]. No other available appointments in [place] early next week. Patient is scheduled for 38 week check up next week.

## 2025-01-31 NOTE — TELEPHONE ENCOUNTER
Return TC to [facility]. Patient unable to come today her children had a school delay r/t weather. Rescheduled patient for growth and NST on Tuesday 2/4 at [place]. Patient confirmed

## 2025-01-31 NOTE — TELEPHONE ENCOUNTER
Called and spoke with patient regarding missed appointment 1/31/2025 at 7:00 am with Dr. Henderson. Patient unable to reschedule at this time and will keep her appointment that is scheduled for 2/7/2025.

## 2025-02-03 ENCOUNTER — ROUTINE PRENATAL (OUTPATIENT)
Dept: OBGYN CLINIC | Facility: CLINIC | Age: 40
End: 2025-02-03

## 2025-02-03 VITALS
DIASTOLIC BLOOD PRESSURE: 80 MMHG | BODY MASS INDEX: 38.73 KG/M2 | HEART RATE: 105 BPM | WEIGHT: 241 LBS | HEIGHT: 66 IN | SYSTOLIC BLOOD PRESSURE: 128 MMHG

## 2025-02-03 DIAGNOSIS — O09.293 HX OF GESTATIONAL DIABETES IN PRIOR PREGNANCY, CURRENTLY PREGNANT, THIRD TRIMESTER: ICD-10-CM

## 2025-02-03 DIAGNOSIS — O09.33 INSUFFICIENT PRENATAL CARE IN THIRD TRIMESTER: ICD-10-CM

## 2025-02-03 DIAGNOSIS — E66.01 SEVERE OBESITY DUE TO EXCESS CALORIES AFFECTING PREGNANCY IN THIRD TRIMESTER (HCC): ICD-10-CM

## 2025-02-03 DIAGNOSIS — O99.213 SEVERE OBESITY DUE TO EXCESS CALORIES AFFECTING PREGNANCY IN THIRD TRIMESTER (HCC): ICD-10-CM

## 2025-02-03 DIAGNOSIS — O99.891 BACK PAIN AFFECTING PREGNANCY IN THIRD TRIMESTER: ICD-10-CM

## 2025-02-03 DIAGNOSIS — Z3A.37 37 WEEKS GESTATION OF PREGNANCY: ICD-10-CM

## 2025-02-03 DIAGNOSIS — M54.9 BACK PAIN AFFECTING PREGNANCY IN THIRD TRIMESTER: ICD-10-CM

## 2025-02-03 DIAGNOSIS — O09.523 AMA (ADVANCED MATERNAL AGE) MULTIGRAVIDA 35+, THIRD TRIMESTER: Primary | ICD-10-CM

## 2025-02-03 DIAGNOSIS — O09.43 GRAND MULTIPARITY WITH CURRENT PREGNANCY IN THIRD TRIMESTER: ICD-10-CM

## 2025-02-03 DIAGNOSIS — O99.013 ANEMIA, ANTEPARTUM, THIRD TRIMESTER: ICD-10-CM

## 2025-02-03 DIAGNOSIS — Z86.32 HX OF GESTATIONAL DIABETES IN PRIOR PREGNANCY, CURRENTLY PREGNANT, THIRD TRIMESTER: ICD-10-CM

## 2025-02-03 PROCEDURE — PNV: Performed by: OBSTETRICS & GYNECOLOGY

## 2025-02-03 NOTE — PROGRESS NOTES
Pt is a 39 y.o.  37w5d  Pregnancy is complicated by AMA, Grand multiparity, anemia, h.o GDM in prioro pregnancy, late entry to care    Pt reports +FM. Denies vb, lof. Notes irregular ctx. Notes she lost her mucus plug.    Labor precautions and fkc reviewed    SVE: -3/50/-3, soft, mid position    Birth plan reviewed and signed

## 2025-02-07 ENCOUNTER — ROUTINE PRENATAL (OUTPATIENT)
Dept: OBGYN CLINIC | Facility: CLINIC | Age: 40
End: 2025-02-07

## 2025-02-07 VITALS
HEIGHT: 66 IN | DIASTOLIC BLOOD PRESSURE: 76 MMHG | HEART RATE: 88 BPM | SYSTOLIC BLOOD PRESSURE: 118 MMHG | OXYGEN SATURATION: 100 % | WEIGHT: 246.4 LBS | BODY MASS INDEX: 39.6 KG/M2

## 2025-02-07 DIAGNOSIS — O09.33 INSUFFICIENT PRENATAL CARE IN THIRD TRIMESTER: ICD-10-CM

## 2025-02-07 DIAGNOSIS — O99.013 ANEMIA, ANTEPARTUM, THIRD TRIMESTER: ICD-10-CM

## 2025-02-07 DIAGNOSIS — Z3A.38 38 WEEKS GESTATION OF PREGNANCY: ICD-10-CM

## 2025-02-07 DIAGNOSIS — Z86.32 HX OF GESTATIONAL DIABETES IN PRIOR PREGNANCY, CURRENTLY PREGNANT, THIRD TRIMESTER: ICD-10-CM

## 2025-02-07 DIAGNOSIS — E66.01 SEVERE OBESITY DUE TO EXCESS CALORIES AFFECTING PREGNANCY IN THIRD TRIMESTER (HCC): ICD-10-CM

## 2025-02-07 DIAGNOSIS — O09.93 ENCOUNTER FOR SUPERVISION OF HIGH RISK PREGNANCY IN THIRD TRIMESTER, ANTEPARTUM: Primary | ICD-10-CM

## 2025-02-07 DIAGNOSIS — O99.213 SEVERE OBESITY DUE TO EXCESS CALORIES AFFECTING PREGNANCY IN THIRD TRIMESTER (HCC): ICD-10-CM

## 2025-02-07 DIAGNOSIS — O09.43 GRAND MULTIPARITY WITH CURRENT PREGNANCY IN THIRD TRIMESTER: ICD-10-CM

## 2025-02-07 DIAGNOSIS — O09.523 AMA (ADVANCED MATERNAL AGE) MULTIGRAVIDA 35+, THIRD TRIMESTER: ICD-10-CM

## 2025-02-07 DIAGNOSIS — O09.293 HX OF GESTATIONAL DIABETES IN PRIOR PREGNANCY, CURRENTLY PREGNANT, THIRD TRIMESTER: ICD-10-CM

## 2025-02-07 PROBLEM — O99.891 BACK PAIN AFFECTING PREGNANCY IN THIRD TRIMESTER: Status: RESOLVED | Noted: 2025-01-30 | Resolved: 2025-02-07

## 2025-02-07 PROBLEM — M54.9 BACK PAIN AFFECTING PREGNANCY IN THIRD TRIMESTER: Status: RESOLVED | Noted: 2025-01-30 | Resolved: 2025-02-07

## 2025-02-07 PROCEDURE — PNV: Performed by: OBSTETRICS & GYNECOLOGY

## 2025-02-07 NOTE — PROGRESS NOTES
"Assessment & Plan  39 y.o.  at 38w2d presenting for routine prenatal visit.     Problem List       Tension type headache    38 weeks gestation of pregnancy    Overview   1 hour GTT elevated, passed 3 hour  [ ]Flu vaccine- declines, [ ] TDAP vaccine - declines  [x]Delivery consent  [ ]Contraception - undecided, may want tubal ligation  F/u repeat growth US at 37 weeks, begin APFS at 37 w [ ]             Grand multiparity with current pregnancy in third trimester    AMA (advanced maternal age) multigravida 35+, third trimester    Insufficient prenatal care in third trimester    Severe obesity due to excess calories affecting pregnancy in third trimester (HCC)    Hx of gestational diabetes in prior pregnancy, currently pregnant, third trimester    Encounter for supervision of high risk pregnancy in third trimester, antepartum    [place] B[place] contractions    Anemia, antepartum, third trimester    Overview   Last hgb 10.3          ____________________________________________________________  Subjective  She is without complaint.   She denies contractions, loss of fluid, or vaginal bleeding.   She feels regular fetal movements.   Declines scheduling induction of labor.    Objective  /76 (BP Location: Right arm, Patient Position: Sitting, Cuff Size: Large)   Pulse 88   Ht 5' 6\" (1.676 m)   Wt 112 kg (246 lb 6.4 oz)   LMP 05/15/2024 (Exact Date)   SpO2 100%   BMI 39.77 kg/m²   FHR: 140 bpm  Fundal height 40 cm    Physical Exam  Constitutional:       Appearance: She is well-developed.   Genitourinary:      Genitourinary Comments: SVE: 2.5/50/-3  Declines membrane sweep   Cardiovascular:      Rate and Rhythm: Normal rate.   Pulmonary:      Effort: Pulmonary effort is normal. No respiratory distress.   Abdominal:      Palpations: Abdomen is soft.      Tenderness: There is no abdominal tenderness.   Skin:     General: Skin is warm and dry.          Patient's Active Problem List  Patient Active Problem List "   Diagnosis    Tension type headache    38 weeks gestation of pregnancy    Grand multiparity with current pregnancy in third trimester    AMA (advanced maternal age) multigravida 35+, third trimester    Insufficient prenatal care in third trimester    Severe obesity due to excess calories affecting pregnancy in third trimester (HCC)    Hx of gestational diabetes in prior pregnancy, currently pregnant, third trimester    Encounter for supervision of high risk pregnancy in third trimester, antepartum    [place] Bravo contractions    Anemia, antepartum, third trimester           Linda Henderson MD  2/7/2025  9:30 AM

## 2025-02-11 PROBLEM — Z3A.39 39 WEEKS GESTATION OF PREGNANCY: Status: ACTIVE | Noted: 2024-08-13

## 2025-02-12 ENCOUNTER — ROUTINE PRENATAL (OUTPATIENT)
Dept: OBGYN CLINIC | Facility: CLINIC | Age: 40
End: 2025-02-12

## 2025-02-12 ENCOUNTER — HOSPITAL ENCOUNTER (INPATIENT)
Facility: HOSPITAL | Age: 40
LOS: 2 days | Discharge: HOME | End: 2025-02-14
Attending: OBSTETRICS & GYNECOLOGY | Admitting: OBSTETRICS & GYNECOLOGY
Payer: COMMERCIAL

## 2025-02-12 VITALS
OXYGEN SATURATION: 98 % | SYSTOLIC BLOOD PRESSURE: 148 MMHG | HEART RATE: 94 BPM | HEIGHT: 66 IN | WEIGHT: 243 LBS | DIASTOLIC BLOOD PRESSURE: 84 MMHG | BODY MASS INDEX: 39.05 KG/M2

## 2025-02-12 DIAGNOSIS — O09.293 HX OF GESTATIONAL DIABETES IN PRIOR PREGNANCY, CURRENTLY PREGNANT, THIRD TRIMESTER: ICD-10-CM

## 2025-02-12 DIAGNOSIS — Z86.32 HX OF GESTATIONAL DIABETES IN PRIOR PREGNANCY, CURRENTLY PREGNANT, THIRD TRIMESTER: ICD-10-CM

## 2025-02-12 DIAGNOSIS — Z3A.39 39 WEEKS GESTATION OF PREGNANCY: Primary | ICD-10-CM

## 2025-02-12 DIAGNOSIS — O16.3 ELEVATED BLOOD PRESSURE COMPLICATING PREGNANCY IN THIRD TRIMESTER, ANTEPARTUM: Primary | ICD-10-CM

## 2025-02-12 DIAGNOSIS — E66.01 SEVERE OBESITY DUE TO EXCESS CALORIES AFFECTING PREGNANCY IN THIRD TRIMESTER (HCC): ICD-10-CM

## 2025-02-12 DIAGNOSIS — O14.13 SEVERE PRE-ECLAMPSIA IN THIRD TRIMESTER: ICD-10-CM

## 2025-02-12 DIAGNOSIS — O99.013 ANEMIA, ANTEPARTUM, THIRD TRIMESTER: ICD-10-CM

## 2025-02-12 DIAGNOSIS — O99.213 SEVERE OBESITY DUE TO EXCESS CALORIES AFFECTING PREGNANCY IN THIRD TRIMESTER (HCC): ICD-10-CM

## 2025-02-12 DIAGNOSIS — O09.523 AMA (ADVANCED MATERNAL AGE) MULTIGRAVIDA 35+, THIRD TRIMESTER: ICD-10-CM

## 2025-02-12 DIAGNOSIS — O09.43 GRAND MULTIPARITY WITH CURRENT PREGNANCY IN THIRD TRIMESTER: ICD-10-CM

## 2025-02-12 DIAGNOSIS — O09.33 INSUFFICIENT PRENATAL CARE IN THIRD TRIMESTER: ICD-10-CM

## 2025-02-12 DIAGNOSIS — Z3A.39 39 WEEKS GESTATION OF PREGNANCY: ICD-10-CM

## 2025-02-12 PROBLEM — O14.10 PREECLAMPSIA, SEVERE: Status: ACTIVE | Noted: 2025-02-12

## 2025-02-12 LAB
ABO GROUP BLD: NORMAL
ALBUMIN SERPL BCG-MCNC: 3.7 G/DL (ref 3.5–5)
ALP SERPL-CCNC: 156 U/L (ref 34–104)
ALT SERPL W P-5'-P-CCNC: 134 U/L (ref 7–52)
ANION GAP SERPL CALCULATED.3IONS-SCNC: 9 MMOL/L (ref 4–13)
AST SERPL W P-5'-P-CCNC: 137 U/L (ref 13–39)
BILIRUB SERPL-MCNC: 0.51 MG/DL (ref 0.2–1)
BLD GP AB SCN SERPL QL: NEGATIVE
BUN SERPL-MCNC: 5 MG/DL (ref 5–25)
CALCIUM SERPL-MCNC: 8.6 MG/DL (ref 8.4–10.2)
CHLORIDE SERPL-SCNC: 104 MMOL/L (ref 96–108)
CO2 SERPL-SCNC: 19 MMOL/L (ref 21–32)
CREAT SERPL-MCNC: 0.49 MG/DL (ref 0.6–1.3)
CREAT UR-MCNC: 32.9 MG/DL
ERYTHROCYTE [DISTWIDTH] IN BLOOD BY AUTOMATED COUNT: 13.7 % (ref 11.6–15.1)
GFR SERPL CREATININE-BSD FRML MDRD: 123 ML/MIN/1.73SQ M
GLUCOSE SERPL-MCNC: 143 MG/DL (ref 65–140)
HCT VFR BLD AUTO: 32.5 % (ref 34.8–46.1)
HGB BLD-MCNC: 10.9 G/DL (ref 11.5–15.4)
MCH RBC QN AUTO: 30.7 PG (ref 26.8–34.3)
MCHC RBC AUTO-ENTMCNC: 33.5 G/DL (ref 31.4–37.4)
MCV RBC AUTO: 92 FL (ref 82–98)
PLATELET # BLD AUTO: 146 THOUSANDS/UL (ref 149–390)
PMV BLD AUTO: 13.9 FL (ref 8.9–12.7)
POTASSIUM SERPL-SCNC: 3.6 MMOL/L (ref 3.5–5.3)
PROT SERPL-MCNC: 6.9 G/DL (ref 6.4–8.4)
PROT UR-MCNC: 5.8 MG/DL
PROT/CREAT UR: 0.2 MG/G{CREAT} (ref 0–0.1)
RBC # BLD AUTO: 3.55 MILLION/UL (ref 3.81–5.12)
RH BLD: POSITIVE
SODIUM SERPL-SCNC: 132 MMOL/L (ref 135–147)
SPECIMEN EXPIRATION DATE: NORMAL
WBC # BLD AUTO: 7.63 THOUSAND/UL (ref 4.31–10.16)

## 2025-02-12 PROCEDURE — NC001 PR NO CHARGE: Performed by: OBSTETRICS & GYNECOLOGY

## 2025-02-12 PROCEDURE — 99212 OFFICE O/P EST SF 10 MIN: CPT

## 2025-02-12 PROCEDURE — PNV: Performed by: OBSTETRICS & GYNECOLOGY

## 2025-02-12 PROCEDURE — 80053 COMPREHEN METABOLIC PANEL: CPT

## 2025-02-12 PROCEDURE — 84156 ASSAY OF PROTEIN URINE: CPT

## 2025-02-12 PROCEDURE — 82570 ASSAY OF URINE CREATININE: CPT

## 2025-02-12 PROCEDURE — 85027 COMPLETE CBC AUTOMATED: CPT

## 2025-02-12 PROCEDURE — 86850 RBC ANTIBODY SCREEN: CPT

## 2025-02-12 PROCEDURE — 86780 TREPONEMA PALLIDUM: CPT

## 2025-02-12 PROCEDURE — 4A1HXCZ MONITORING OF PRODUCTS OF CONCEPTION, CARDIAC RATE, EXTERNAL APPROACH: ICD-10-PCS | Performed by: OBSTETRICS & GYNECOLOGY

## 2025-02-12 PROCEDURE — 86901 BLOOD TYPING SEROLOGIC RH(D): CPT

## 2025-02-12 PROCEDURE — 86900 BLOOD TYPING SEROLOGIC ABO: CPT

## 2025-02-12 RX ORDER — MAGNESIUM SULFATE HEPTAHYDRATE 40 MG/ML
2 INJECTION, SOLUTION INTRAVENOUS CONTINUOUS
Status: DISCONTINUED | OUTPATIENT
Start: 2025-02-12 | End: 2025-02-14

## 2025-02-12 RX ORDER — BUPIVACAINE HYDROCHLORIDE 2.5 MG/ML
30 INJECTION, SOLUTION EPIDURAL; INFILTRATION; INTRACAUDAL ONCE AS NEEDED
Status: DISCONTINUED | OUTPATIENT
Start: 2025-02-12 | End: 2025-02-14 | Stop reason: HOSPADM

## 2025-02-12 RX ORDER — OXYTOCIN/RINGER'S LACTATE 30/500 ML
1-30 PLASTIC BAG, INJECTION (ML) INTRAVENOUS
Status: DISCONTINUED | OUTPATIENT
Start: 2025-02-12 | End: 2025-02-13 | Stop reason: SDUPTHER

## 2025-02-12 RX ORDER — ACETAMINOPHEN 325 MG/1
650 TABLET ORAL EVERY 6 HOURS PRN
Status: DISCONTINUED | OUTPATIENT
Start: 2025-02-12 | End: 2025-02-13 | Stop reason: SDUPTHER

## 2025-02-12 RX ORDER — MAGNESIUM SULFATE HEPTAHYDRATE 40 MG/ML
2 INJECTION, SOLUTION INTRAVENOUS ONCE
Status: COMPLETED | OUTPATIENT
Start: 2025-02-12 | End: 2025-02-12

## 2025-02-12 RX ORDER — MAGNESIUM SULFATE HEPTAHYDRATE 40 MG/ML
4 INJECTION, SOLUTION INTRAVENOUS ONCE
Status: COMPLETED | OUTPATIENT
Start: 2025-02-12 | End: 2025-02-12

## 2025-02-12 RX ORDER — CALCIUM GLUCONATE 94 MG/ML
1 INJECTION, SOLUTION INTRAVENOUS ONCE AS NEEDED
Status: DISCONTINUED | OUTPATIENT
Start: 2025-02-12 | End: 2025-02-14 | Stop reason: HOSPADM

## 2025-02-12 RX ORDER — ONDANSETRON 2 MG/ML
4 INJECTION INTRAMUSCULAR; INTRAVENOUS EVERY 6 HOURS PRN
Status: DISCONTINUED | OUTPATIENT
Start: 2025-02-12 | End: 2025-02-14 | Stop reason: HOSPADM

## 2025-02-12 RX ORDER — SODIUM CHLORIDE, SODIUM LACTATE, POTASSIUM CHLORIDE, CALCIUM CHLORIDE 600; 310; 30; 20 MG/100ML; MG/100ML; MG/100ML; MG/100ML
50 INJECTION, SOLUTION INTRAVENOUS CONTINUOUS
Status: DISCONTINUED | OUTPATIENT
Start: 2025-02-12 | End: 2025-02-14 | Stop reason: HOSPADM

## 2025-02-12 RX ADMIN — MAGNESIUM SULFATE HEPTAHYDRATE 2 G: 40 INJECTION, SOLUTION INTRAVENOUS at 18:13

## 2025-02-12 RX ADMIN — MAGNESIUM SULFATE HEPTAHYDRATE 2 G/HR: 40 INJECTION, SOLUTION INTRAVENOUS at 18:33

## 2025-02-12 RX ADMIN — SODIUM CHLORIDE, SODIUM LACTATE, POTASSIUM CHLORIDE, AND CALCIUM CHLORIDE 50 ML/HR: .6; .31; .03; .02 INJECTION, SOLUTION INTRAVENOUS at 17:52

## 2025-02-12 RX ADMIN — Medication 2 MILLI-UNITS/MIN: at 20:00

## 2025-02-12 RX ADMIN — MAGNESIUM SULFATE HEPTAHYDRATE 4 G: 40 INJECTION, SOLUTION INTRAVENOUS at 17:49

## 2025-02-12 NOTE — LETTER
[facility]  [address] [address]  Dept: [phone]    February 16, 2025     Patient: Radha Fonseca   YOB: 1985   Date of Visit: 2/12/2025       To Whom it May Concern:    Radha Fonseca is under my professional care. She was seen in the hospital from 2/12/2025 to 2/14/2025.    If you have any questions or concerns, please don't hesitate to call.         Sincerely,          Rachele Banegas MD

## 2025-02-12 NOTE — PROGRESS NOTES
Pt is a 39 y.o.  39w0d  Pregnancy is complicated by grand multiparity, AMA, insufficient prenatal care, obesity, h.o GDM in prior pregnancy, anemia    Pt reports +FM. Denies vb, lof. Notes irregular ctx. Labor precautions and fkc reviewed    SVE: 2-3/60/-2    Reviewed with patient she has had an elevated blood pressure for the first time this pregnancy. She denies any h/a, scotomata, RUQ pain.   Repeat bp at end of the visit 148/84. Recommend evaluation at L&D to r/o GHTN/Pre-eclampisa. Pt is agreeable.    Team notified.

## 2025-02-12 NOTE — OB LABOR/OXYTOCIN SAFETY PROGRESS
Labor Progress Note - Radha Fonseca 39 y.o. female MRN: [id number]    Unit/Bed#: -01 Encounter: [id number]                Cervical Dilation: 1-2        Cervical Effacement: 70  Fetal Station: -2        FHR Category: 1               Vital Signs:   Vitals:    02/12/25 1802   BP: 158/87   Pulse: 96     Notes/comments:   Cervical balloon counseling  Discussed risks (AROM, discomfort with placement, possibly of suboptimal dilation if not placed adequately, risk of infection if in place with ROM), benefits (dilation of cervix without medications), alternatives (IV Pitocin or further Cytotec). Discussed that ramirez would be removed after 12 hours or with ROM unless spontaneously expelled prior. She is agreeable to Ramirez placement and gave verbal consent prior to placement.    Cervical Balloon Insertion  A 24F ramirez with a 30cc balloon was selected, SVE was performed and cervix was located, ramirez was introduced over sterile gloved hands. Balloon advanced through cervix beyond the internal cervical os. A small amount amount of sterile saline solution was instilled in the balloon to confirm placement. Placement was confirmed to be beyond the internal cervical os. A total of 60cc of sterile saline solution was placed into the balloon. Pt tolerated well. Instructions left with RN to place ramirez on tension. Notify MD when ramirez dislodged     Nely Glover MD 2/12/2025 6:38 PM

## 2025-02-12 NOTE — H&P
H & P- Obstetrics   Radha Fonseca 39 y.o. female MRN: [id number]  Unit/Bed#: -01 Encounter: [id number]    ObGyn Provider: [facility] (Dr. Neff, Dr. Elias, Dr. Henderson)  Assessment: 39 y.o.  at 39w0d (2025, by Last Menstrual Period) admitted for induction of labor for gestational hypertension.  SVE: 1.5/70/-3  FHT Category 1  Clinical EFW:  1475  g (59%ile) @ 29w2d; Cephalic confirmed by BSUS  GBS: negative    Plan:   * Preeclampsia, severe  Assessment & Plan  - Met criteria due to elevated LFTs  - Started Magnesium 4g, 2g/h    Admit to OBGYN   Clear liquid diet   F/u T&S, CBC, RPR   IVF LR 125cc/hr   Continuous fetal monitoring and tocometry   Analgesia at maternal request   Vertex by TAUS  Induction plan Pitocin        Discussed case and plan w/ Yardlie Toussaint-Nilson DO    Chief Complaint: I have preeclampsia  HPI: Radha Fonseca is a 39 y.o.  with an VASILE of 2025, by Last Menstrual Period at 39w0d who is being admitted for induction of labor for preeclampsia with severe features. She has no headache or blurry vision. She denies having uterine contractions, has no LOF, and reports no VB. She states she has felt good FM.  Her current pregnancy is notable for  grand multiparity, limited prenatal care    ROS  General: No fevers, chills or night sweats  Cardiovascular: No chest pain, or wheezing  HEENT: No visual changes  GI: No diarrhea, No blood in stools or black stools  : No dysuria or hematuria    Allergies   Allergen Reactions    Sesame Seed (Diagnostic) - Food Allergy Anaphylaxis     OB History    Para Term  AB Living   8 5 5 0 2 5   SAB IAB Ectopic Multiple Live Births   1 0 1 0 5      # Outcome Date GA Lbr Cesar/2nd Weight Sex Type Anes PTL Lv   8 Current            7 Term 23 40w4d / 00:01 3350 g (7 lb 6.2 oz) F Vag-Spont EPI  EDER   6 Term 2020 40w6d  3921 g (8 lb 10.3 oz) M Vag-Spont  N EDER   5 Term 2019 39w1d  3487 g (7 lb 11 oz) M  Vag-Spont  N EDER   4 Ectopic 05/2017              Birth Comments: ? chemical pregnancy   3 Term 07/2011 40w0d  3751 g (8 lb 4.3 oz) F Vag-Spont  N EDER   2 Term 10/2009 40w0d  2892 g (6 lb 6 oz) M Vag-Spont  N EDER   1 SAB 2008     SAB         Birth Comments: D&C     Past Medical History:   Diagnosis Date    Abnormal Pap smear of cervix     2020, ASCUS/ neg HPV    Chlamydia     2013    Ectopic pregnancy     Gestational hypertension 05/08/2023    H/O hernia repair 10/2015    History of cholecystectomy 05/2015    History of gestational diabetes 12/2018    insulin dependent    Miscarriage     Varicella      Past Surgical History:   Procedure Laterality Date    CHOLECYSTECTOMY      DILATION AND CURETTAGE OF UTERUS  2008     Social History     Tobacco Use   Smoking Status Never   Smokeless Tobacco Never       Medications Prior to Admission:     Iron 18 MG/15ML LIQD    methocarbamol (ROBAXIN) 500 mg tablet    Prenatal Vit-Fe Fumarate-FA (PRENATAL 1+1 PO)    Objective:  HR:  [] 100  BP: (131-150)/(74-85) 150/85  SpO2:  [98 %] 98 %  There is no height or weight on file to calculate BMI.   General Physical Exam  General: The patient was alert and oriented x3, pleasant well-appearing female in no acute distress  Lungs: Non-labored breathing  Abdomen: Soft, Non-tender, Gravid  Lower extremities: Non-tender  Psych: Normal affect and judgement, cooperative    Fetal monitoring:  Fetal heart rate:    [place]:    Labs:  Lab Results   Component Value Date    WBC 7.63 02/12/2025    HGB 10.9 (L) 02/12/2025    HCT 32.5 (L) 02/12/2025     (L) 02/12/2025     Lab Results   Component Value Date    K 3.6 02/12/2025     02/12/2025    CO2 19 (L) 02/12/2025    BUN 5 02/12/2025    CREATININE 0.49 (L) 02/12/2025     (H) 02/12/2025     (H) 02/12/2025     Prenatal Labs: Reviewed   Lab Results   Component Value Date    ABO B 05/07/2023    RH Positive 05/07/2023    ABS Negative 05/07/2023    STREPGRPB Negative 01/23/2025     LABCHLA Negative 09/10/2024    LABNGO Negative 09/10/2024    RPR Non-Reactive 10/22/2022    SYPHILISAB Non-reactive 11/16/2024    SYPHILISAB nonreative 10/22/2022    HIVAGAB Non-Reactive 10/22/2022    HEPBSAG Non-reactive 11/16/2024    HEPCAB Non-reactive 11/16/2024    VARICELLAIGG IMMUNE 10/22/2022    [id number] 146 (H) 11/16/2024    [id number] 98 12/17/2024    RUBELLAIGGQT 38.3 11/16/2024        >2 Midnights  INPATIENT   Signature/Title:  Nely Glover MD  Date: 2/12/2025  Time: 5:51 PM

## 2025-02-13 LAB
ALBUMIN SERPL BCG-MCNC: 3.8 G/DL (ref 3.5–5)
ALBUMIN SERPL BCG-MCNC: 4 G/DL (ref 3.5–5)
ALBUMIN SERPL BCG-MCNC: 4 G/DL (ref 3.5–5)
ALP SERPL-CCNC: 150 U/L (ref 34–104)
ALP SERPL-CCNC: 160 U/L (ref 34–104)
ALP SERPL-CCNC: 170 U/L (ref 34–104)
ALT SERPL W P-5'-P-CCNC: 132 U/L (ref 7–52)
ALT SERPL W P-5'-P-CCNC: 140 U/L (ref 7–52)
ALT SERPL W P-5'-P-CCNC: 140 U/L (ref 7–52)
ANION GAP SERPL CALCULATED.3IONS-SCNC: 10 MMOL/L (ref 4–13)
ANION GAP SERPL CALCULATED.3IONS-SCNC: 7 MMOL/L (ref 4–13)
ANION GAP SERPL CALCULATED.3IONS-SCNC: 9 MMOL/L (ref 4–13)
AST SERPL W P-5'-P-CCNC: 102 U/L (ref 13–39)
AST SERPL W P-5'-P-CCNC: 139 U/L (ref 13–39)
AST SERPL W P-5'-P-CCNC: 142 U/L (ref 13–39)
BASE EXCESS BLDCOA CALC-SCNC: -1.4 MMOL/L (ref 3–11)
BASE EXCESS BLDCOV CALC-SCNC: -4.7 MMOL/L (ref 1–9)
BILIRUB SERPL-MCNC: 0.28 MG/DL (ref 0.2–1)
BILIRUB SERPL-MCNC: 0.36 MG/DL (ref 0.2–1)
BILIRUB SERPL-MCNC: 0.41 MG/DL (ref 0.2–1)
BUN SERPL-MCNC: 3 MG/DL (ref 5–25)
CALCIUM SERPL-MCNC: 7.4 MG/DL (ref 8.4–10.2)
CALCIUM SERPL-MCNC: 7.8 MG/DL (ref 8.4–10.2)
CALCIUM SERPL-MCNC: 8.3 MG/DL (ref 8.4–10.2)
CHLORIDE SERPL-SCNC: 102 MMOL/L (ref 96–108)
CHLORIDE SERPL-SCNC: 103 MMOL/L (ref 96–108)
CHLORIDE SERPL-SCNC: 103 MMOL/L (ref 96–108)
CO2 SERPL-SCNC: 19 MMOL/L (ref 21–32)
CO2 SERPL-SCNC: 21 MMOL/L (ref 21–32)
CO2 SERPL-SCNC: 22 MMOL/L (ref 21–32)
CREAT SERPL-MCNC: 0.53 MG/DL (ref 0.6–1.3)
CREAT SERPL-MCNC: 0.54 MG/DL (ref 0.6–1.3)
CREAT SERPL-MCNC: 0.55 MG/DL (ref 0.6–1.3)
ERYTHROCYTE [DISTWIDTH] IN BLOOD BY AUTOMATED COUNT: 14 % (ref 11.6–15.1)
ERYTHROCYTE [DISTWIDTH] IN BLOOD BY AUTOMATED COUNT: 14 % (ref 11.6–15.1)
ERYTHROCYTE [DISTWIDTH] IN BLOOD BY AUTOMATED COUNT: 14.1 % (ref 11.6–15.1)
GFR SERPL CREATININE-BSD FRML MDRD: 119 ML/MIN/1.73SQ M
GFR SERPL CREATININE-BSD FRML MDRD: 120 ML/MIN/1.73SQ M
GFR SERPL CREATININE-BSD FRML MDRD: 120 ML/MIN/1.73SQ M
GLUCOSE SERPL-MCNC: 112 MG/DL (ref 65–140)
GLUCOSE SERPL-MCNC: 118 MG/DL (ref 65–140)
GLUCOSE SERPL-MCNC: 96 MG/DL (ref 65–140)
HCO3 BLDCOA-SCNC: 26.2 MMOL/L (ref 17.3–27.3)
HCO3 BLDCOV-SCNC: 20.8 MMOL/L (ref 12.2–28.6)
HCT VFR BLD AUTO: 34.9 % (ref 34.8–46.1)
HCT VFR BLD AUTO: 35.3 % (ref 34.8–46.1)
HCT VFR BLD AUTO: 38.3 % (ref 34.8–46.1)
HGB BLD-MCNC: 11.6 G/DL (ref 11.5–15.4)
HGB BLD-MCNC: 11.7 G/DL (ref 11.5–15.4)
HGB BLD-MCNC: 11.9 G/DL (ref 11.5–15.4)
MCH RBC QN AUTO: 30.4 PG (ref 26.8–34.3)
MCH RBC QN AUTO: 30.4 PG (ref 26.8–34.3)
MCH RBC QN AUTO: 31.2 PG (ref 26.8–34.3)
MCHC RBC AUTO-ENTMCNC: 31.1 G/DL (ref 31.4–37.4)
MCHC RBC AUTO-ENTMCNC: 32.9 G/DL (ref 31.4–37.4)
MCHC RBC AUTO-ENTMCNC: 33.5 G/DL (ref 31.4–37.4)
MCV RBC AUTO: 92 FL (ref 82–98)
MCV RBC AUTO: 93 FL (ref 82–98)
MCV RBC AUTO: 98 FL (ref 82–98)
O2 CT VFR BLDCOA CALC: 3.8 ML/DL
OXYHGB MFR BLDCOA: 17.8 %
OXYHGB MFR BLDCOV: 66.5 %
PCO2 BLDCOA: 56.1 MM[HG] (ref 30–60)
PCO2 BLDCOV: 40.2 MM HG (ref 27–43)
PH BLDCOA: 7.29 [PH] (ref 7.23–7.43)
PH BLDCOV: 7.33 [PH] (ref 7.19–7.49)
PLATELET # BLD AUTO: 131 THOUSANDS/UL (ref 149–390)
PLATELET # BLD AUTO: 144 THOUSANDS/UL (ref 149–390)
PLATELET # BLD AUTO: 161 THOUSANDS/UL (ref 149–390)
PMV BLD AUTO: 13.6 FL (ref 8.9–12.7)
PMV BLD AUTO: 13.9 FL (ref 8.9–12.7)
PMV BLD AUTO: 14 FL (ref 8.9–12.7)
PO2 BLDCOA: 13.1 MM HG (ref 5–25)
PO2 BLDCOV: 30.7 MM HG (ref 15–45)
POTASSIUM SERPL-SCNC: 3.9 MMOL/L (ref 3.5–5.3)
PROT SERPL-MCNC: 6.8 G/DL (ref 6.4–8.4)
PROT SERPL-MCNC: 7.3 G/DL (ref 6.4–8.4)
PROT SERPL-MCNC: 7.4 G/DL (ref 6.4–8.4)
RBC # BLD AUTO: 3.75 MILLION/UL (ref 3.81–5.12)
RBC # BLD AUTO: 3.82 MILLION/UL (ref 3.81–5.12)
RBC # BLD AUTO: 3.92 MILLION/UL (ref 3.81–5.12)
SAO2 % BLDCOV: 15.3 ML/DL
SODIUM SERPL-SCNC: 132 MMOL/L (ref 135–147)
TREPONEMA PALLIDUM IGG+IGM AB [PRESENCE] IN SERUM OR PLASMA BY IMMUNOASSAY: NORMAL
WBC # BLD AUTO: 11.49 THOUSAND/UL (ref 4.31–10.16)
WBC # BLD AUTO: 6.92 THOUSAND/UL (ref 4.31–10.16)
WBC # BLD AUTO: 8.57 THOUSAND/UL (ref 4.31–10.16)

## 2025-02-13 PROCEDURE — 80053 COMPREHEN METABOLIC PANEL: CPT

## 2025-02-13 PROCEDURE — NC001 PR NO CHARGE: Performed by: OBSTETRICS & GYNECOLOGY

## 2025-02-13 PROCEDURE — 85027 COMPLETE CBC AUTOMATED: CPT

## 2025-02-13 PROCEDURE — 82805 BLOOD GASES W/O2 SATURATION: CPT | Performed by: OBSTETRICS & GYNECOLOGY

## 2025-02-13 PROCEDURE — 59400 OBSTETRICAL CARE: CPT | Performed by: OBSTETRICS & GYNECOLOGY

## 2025-02-13 RX ORDER — SIMETHICONE 80 MG
80 TABLET,CHEWABLE ORAL 4 TIMES DAILY PRN
Status: DISCONTINUED | OUTPATIENT
Start: 2025-02-13 | End: 2025-02-14 | Stop reason: HOSPADM

## 2025-02-13 RX ORDER — ACETAMINOPHEN 325 MG/1
650 TABLET ORAL EVERY 4 HOURS PRN
Status: DISCONTINUED | OUTPATIENT
Start: 2025-02-13 | End: 2025-02-14 | Stop reason: HOSPADM

## 2025-02-13 RX ORDER — NIFEDIPINE 30 MG/1
30 TABLET, EXTENDED RELEASE ORAL DAILY
Status: DISCONTINUED | OUTPATIENT
Start: 2025-02-13 | End: 2025-02-14

## 2025-02-13 RX ORDER — OXYTOCIN/RINGER'S LACTATE 30/500 ML
250 PLASTIC BAG, INJECTION (ML) INTRAVENOUS ONCE
Status: COMPLETED | OUTPATIENT
Start: 2025-02-13 | End: 2025-02-13

## 2025-02-13 RX ORDER — IBUPROFEN 600 MG/1
600 TABLET, FILM COATED ORAL EVERY 6 HOURS
Status: DISCONTINUED | OUTPATIENT
Start: 2025-02-13 | End: 2025-02-14 | Stop reason: HOSPADM

## 2025-02-13 RX ORDER — ECHINACEA PURPUREA EXTRACT 125 MG
1 TABLET ORAL
Status: DISCONTINUED | OUTPATIENT
Start: 2025-02-13 | End: 2025-02-14 | Stop reason: HOSPADM

## 2025-02-13 RX ADMIN — BENZOCAINE AND LEVOMENTHOL 1 APPLICATION: 200; 5 SPRAY TOPICAL at 01:50

## 2025-02-13 RX ADMIN — IBUPROFEN 600 MG: 600 TABLET, FILM COATED ORAL at 15:44

## 2025-02-13 RX ADMIN — Medication 250 MILLI-UNITS/MIN: at 00:44

## 2025-02-13 RX ADMIN — ACETAMINOPHEN 650 MG: 325 TABLET, FILM COATED ORAL at 04:48

## 2025-02-13 RX ADMIN — MAGNESIUM SULFATE HEPTAHYDRATE 2 G/HR: 40 INJECTION, SOLUTION INTRAVENOUS at 04:16

## 2025-02-13 RX ADMIN — IBUPROFEN 600 MG: 600 TABLET, FILM COATED ORAL at 01:50

## 2025-02-13 RX ADMIN — NIFEDIPINE 30 MG: 30 TABLET, FILM COATED, EXTENDED RELEASE ORAL at 15:44

## 2025-02-13 RX ADMIN — IBUPROFEN 600 MG: 600 TABLET, FILM COATED ORAL at 21:46

## 2025-02-13 RX ADMIN — WITCH HAZEL 1 PAD: 500 SOLUTION RECTAL; TOPICAL at 01:50

## 2025-02-13 RX ADMIN — SALINE NASAL SPRAY 1 SPRAY: 1.5 SOLUTION NASAL at 15:29

## 2025-02-13 RX ADMIN — MAGNESIUM SULFATE HEPTAHYDRATE 2 G/HR: 40 INJECTION, SOLUTION INTRAVENOUS at 13:50

## 2025-02-13 RX ADMIN — MAGNESIUM SULFATE HEPTAHYDRATE 2 G/HR: 40 INJECTION, SOLUTION INTRAVENOUS at 23:22

## 2025-02-13 RX ADMIN — SODIUM CHLORIDE, SODIUM LACTATE, POTASSIUM CHLORIDE, AND CALCIUM CHLORIDE 50 ML/HR: .6; .31; .03; .02 INJECTION, SOLUTION INTRAVENOUS at 13:46

## 2025-02-13 RX ADMIN — IBUPROFEN 600 MG: 600 TABLET, FILM COATED ORAL at 09:38

## 2025-02-13 NOTE — DISCHARGE INSTR - AVS FIRST PAGE
Self Care After Delivery   AMBULATORY CARE:   The postpartum period is the period of time from delivery to about 6 weeks. During this time you may experience many physical and emotional changes. It is important to understand what is normal and when you need to call your healthcare provider. It is also important to know how to care for yourself during this time.  Call your local emergency number (911 in the [place]) for any of the following:   You see or hear things that are not there, or have thoughts of harming yourself or your baby.     You soak through 1 pad in 15 minutes, have blurry vision, clammy or pale skin, and feel faint.     You faint or lose consciousness.     You have trouble breathing.     You cough up blood.     Your  incision comes apart.     Seek care immediately if:   Your heart is beating faster than usual.     You have a bad headache or changes in your vision.     Your perineal tear, episiotomy site, or  incision is red, swollen, bleeding, or draining pus.     You have severe abdominal pain.     Call your doctor or obstetrician if:   Your leg is painful, red, and larger than usual.     You soak through 1 or more pads in an hour, or pass blood clots larger than a quarter from your vagina.     You have a fever.     You have new or worsening pain in your abdomen or vagina.     You continue to have depression 1 to 2 weeks after you deliver.     You have trouble sleeping.     You have foul-smelling discharge from your vagina.     You have pain or burning when you urinate.     You do not have a bowel movement for 3 days or more.     You have nausea or are vomiting.     You have hard lumps or red streaks over your breasts.     You have cracked nipples or bleed from your nipples.     You have questions or concerns about your condition or care.     Physical changes: The following are normal changes after you give birth:  Pain in the area between your anus and vagina     Breast pain      Constipation or hemorrhoids     Hot or cold flashes     Vaginal bleeding or discharge     Mild to moderate abdominal cramping     Difficulty controlling bowel movements or urine     Emotional changes: A drop in hormone levels after you deliver may cause changes in your emotions. You may feel irritable, sad, or anxious. You may cry easily or for no reason. You may also feel depressed. Depression that continues can be a sign of postpartum depression, a condition that can be treated. Treatment may include talk therapy, medicines, or both. Healthcare providers will ask how you are feeling and if you have any depression. These talks can happen during appointments for your medical care and for your baby's care, such as well child visits. Providers can help you find ways to care for yourself and your baby. Talk to your providers about the following:  When emotional changes or depression started, and if it is getting worse over time     Problems you are having with daily activities, sleep, or caring for your baby     If anything makes you feel worse, or makes you feel better     Feeling that you are not bonding with your baby the way you want     Any problems your baby has with sleeping or feeding     Your baby is fussy or cries a lot     Support you have from friends, family, or others     Breast care for breastfeeding mothers: You may have sore breasts for 3 to 6 days after you give birth. This happens as your milk begins to fill your breasts. You may also have sore breasts if you do not breastfeed frequently. Do the following to care for your breasts:  Apply a moist, warm, compress to your breast as directed. This may help soothe your breasts. Make sure the washcloth is not too hot before you apply it to your breast.     Nurse your baby or pump your milk frequently. This may prevent clogged milk ducts. Ask your healthcare provider how often to nurse or pump.     Massage your breasts as directed. This may help increase  your milk flow. Gently rub your breasts in a circular motion before you breastfeed. You may need to gently squeeze your breast or nipple to help release milk. You can also use a breast pump to help release milk from your breast.     Wash your breasts with warm water only. Do not put soap on your nipples. Soap may cause your nipples to become dry.     Apply lanolin cream to your nipples as directed. Lanolin cream may add moisture to your skin and prevent nipple dryness. Always wash off lanolin cream with warm water before you breastfeed.     Place pads in your bra. Your nipples may leak milk when you are not breastfeeding. You can place pads inside of your bra to help prevent leaking onto your clothing. Ask your healthcare provider where to purchase bra pads.     Get breastfeeding support if needed. Healthcare providers can answer questions about breastfeeding and provide you with support. Ask your healthcare provider who you can contact if you need breastfeeding support.     Breast care for non-breastfeeding mothers: Milk will fill your breasts even if you bottle feed your baby. Do the following to help stop your milk from filling your breasts and causing pain:  Wear a bra with support at all times. A sports bra or a tight-fitting bra will help stop your milk from coming in.     Apply ice on each breast for 15 to 20 minutes every hour or as directed. Use an ice pack, or put crushed ice in a plastic bag. Cover it with a towel before you apply it to your breast. Ice helps your milk ducts shrink.     Keep your breasts away from warm water. Warm water will make it easier for milk to fill your breasts. Stand with your breasts away from warm water in the shower.     Limit how much you touch your breasts. This will prevent them from filling with milk.     Perineum care: Your perineum is the area between your rectum and vagina. It is normal to have swelling and pain in this area after you give birth. If you had an  episiotomy, your healthcare provider may give you special instructions.  Clean your perineum after you use the bathroom. This may prevent infection and help with healing. Use a spray bottle with warm water to clean your perineum. You may also gently spray warm water against your perineum when you urinate. Always wipe front to back.     Take a sitz bath as directed. A sitz bath may help relieve swelling and pain. Fill your bath tub or bucket with water up to your hips and sit in the water. Use cold water for 2 days after you deliver. Then use warm water. Ask your healthcare provider for more information about a sitz bath.     Apply ice packs for the first 24 hours or as directed. Use a plastic glove filled with ice or buy an ice pack. Wrap the ice pack or plastic glove in a small towel or wash cloth. Place the ice pack on your perineum for 20 minutes at a time.     Sit on a donut-shaped pillow. This may relieve pressure on your perineum when you sit.     Use wipes that contain medicine or take pills as directed. Your healthcare provider may tell you to use witch hazel pads. You can place witch hazel pads in the refrigerator before you apply them to your perineum. Your provider may also tell you to take NSAIDs. Ask him or her how often to take pills or use the wipes.     Do not go swimming or take tub baths for 4 to 6 weeks or as directed. This will help prevent an infection in your vagina or uterus.     Bowel and bladder care: It may take 3 to 5 days to have a bowel movement after you deliver your baby. You can do the following to prevent or manage constipation, and get control of your bowel or bladder:  Take stool softeners as directed. A stool softener is medicine that will make your bowel movements softer. This may prevent or relieve constipation. A stool softener may also make bowel movements less painful.     Drink plenty of liquids. Ask how much liquid to drink each day and which liquids are best for you.  Liquids may help prevent constipation.     Eat foods high in fiber. Examples include fruits, vegetables, grains, beans, and lentils. Ask your healthcare provider how much fiber you need each day. Fiber may prevent constipation.          Do Kegel exercises as directed. Kegel exercises will help strengthen the muscles that control bowel movements and urination. Ask your healthcare provider for more information on Kegel exercises.     Apply cold compresses or medicine to hemorrhoids as directed. This may relieve swelling and pain. Your healthcare provider may tell you to apply ice or wipes that contain medicine to your hemorrhoids. He or she may also tell you to use a sitz bath. Ask your provider for more information on how to manage hemorrhoids.     Nutrition: Good nutrition is important in the postpartum period. It will help you return to a healthy weight, increase your energy levels, and prevent constipation. It will also help you get enough nutrients and calories if you are going to breastfeed your baby.  Eat a variety of healthy foods. Healthy foods include fruits, vegetables, whole-grain breads, low-fat dairy products, beans, lean meats, and fish. You may need 500 to 700 extra calories each day if you breastfeed your baby. You may also need extra protein.          Limit foods with added sugar and high amounts of fat. These foods are high in calories and low in healthy nutrients. Read food labels so you know how much sugar and fat is in the food you want to eat.     Drink 8 to 10 glasses of water per day. Water will help you make plenty of milk for your baby. It will also help prevent constipation. Drink a glass of water every time you breastfeed your baby.     Take vitamins as directed. Ask your healthcare provider what vitamins you need.     Limit caffeine and alcohol if you are breastfeeding. Caffeine and alcohol can get into your breast milk. Caffeine and alcohol can make your baby fussy. They can also  interfere with your baby's sleep. Ask your healthcare provider if you can drink alcohol or caffeine.     Rest and sleep: You may feel very tired in the postpartum period. Enough sleep will help you heal and give you energy to care for your baby. The following may help you get sleep and rest:  Nap when your baby naps. Your baby may nap several times during the day. Get rest during this time.     Limit visitors. Many people may want to see you and your baby. Ask friends or family to visit on different days. This will give you time to rest.     Do not plan too much for one day. Put off household chores so that you have time to rest. Gradually do more each day.     Ask for help from family, friends, or neighbors. Ask them to help you with laundry, cleaning, or errands. Also ask someone to watch the baby while you take a nap or relax. Ask your partner to help with the care of your baby. Pump some of your breast milk so your partner can feed your baby during the night.     Exercise after delivery: Wait until your healthcare provider says it is okay to exercise. Exercise can help you lose weight, increase your energy levels, and manage your mood. It can also prevent constipation and blood clots. Start with gentle exercises such as walking. Do more as you have more energy. You may need to avoid abdominal exercises for 1 to 2 weeks after you deliver. Talk to your healthcare provider about an exercise plan that is right for you.     Sexual activity after delivery:   Do not have sex until your healthcare provider says it is okay. You may need to wait 4 to 6 weeks before you have sex. This may prevent infection and allow time to heal.     Your menstrual cycle may begin as soon as 3 weeks after you deliver. Your period may be delayed if you breastfeed your baby. You can become pregnant before you get your first postpartum period. Talk to your healthcare provider about birth control that is right for you. Some types of birth  control are not safe during breastfeeding.     For support and more information: Join a support group for new mothers. Ask for help from family and friends with chores, errands, and care of your baby.  Office of Women's Health,  Department of Health and Human Services  [address] [place] [address]  [address] [place] [address]  Phone: [phone]  Web Address: www.womenshealth.gov  [facility]  [address] [place] [address]  Web Address: [email or web address]  Follow up with your doctor or obstetrician as directed: You will need to follow up within 2 to 6 weeks of delivery. Write down your questions so you remember to ask them at your visits.  © Copyright [employer] 2020 Information is for End User's use only and may not be sold, redistributed or otherwise used for commercial purposes. All illustrations and images included in CareNotes® are the copyrighted property of [employer] or [employer]  The above information is an  only. It is not intended as medical advice for individual conditions or treatments. Talk to your doctor, nurse or pharmacist before following any medical regimen to see if it is safe and effective for you.

## 2025-02-13 NOTE — DISCHARGE SUMMARY
"Discharge Summary - Radha Fonseca 39 y.o. female MRN: [id number]    Unit/Bed#: -01 Encounter: [id number]    ADMISSION  Admission Date: 2025   Admitting Attending: Dr. Yardlie Toussaint-Foster*  Admitting Diagnoses:   Patient Active Problem List   Diagnosis    Tension type headache    39 weeks gestation of pregnancy    Grand multiparity with current pregnancy in third trimester    AMA (advanced maternal age) multigravida 35+, third trimester    Insufficient prenatal care in third trimester    Severe obesity due to excess calories affecting pregnancy in third trimester (HCC)    Hx of gestational diabetes in prior pregnancy, currently pregnant, third trimester    Encounter for supervision of high risk pregnancy in third trimester, antepartum    [place] Bravo contractions    Anemia, antepartum, third trimester    Preeclampsia, severe     (spontaneous vaginal delivery)       DELIVERY  Delivery Method: Vaginal, Spontaneous   Delivery Date and Time: 2025 12:06 AM  Delivery Attending: Yardlie Toussaint-Foster    DISCHARGE  Discharge Date: ***  Discharge Attending: ***  Discharge Diagnosis:   Same, Delivered  ***  Clinical course: Admission to Delivery  Radha Fonseca is a 39 y.o.  at 39wk1d. She presented to labor and delivery to r/o pre-eclampsia in the setting of [facility]. Patient was diagnosed with pre-eclampsia with severe features elevated liver enzymes. She was induced with ramirez balloon, pitocin, and amniotomy. She progressed to complete cervical dilation and began pushing.       Delivery  Route of Delivery: Vaginal, Spontaneous      Anesthesia: None,   QBL: 0 ml    Delivery: Vaginal, Spontaneous at 2025 12:06 AM  Laceration: Perineal:   Repaired?      Baby's Weight: 3460 g (7 lb 10.1 oz); 122.05    Apgar scores: 8  and 9  at 1 and 5 minutes, respectively      Clinical Course: Post-Delivery:  The post delivery course was {beounremarkable:77410::\"unremarkable.\"}    On the day of " discharge, the patient was ambulating, voiding spontaneously, tolerating oral intake, and hemodynamically stable. She was able to reasonably perform all ADLs. She had appropriate bowel function. Pain was well-controlled. She was discharged home on postpartum/postop day #*** without complications***. Patient was instructed to follow up with her OB as an outpatient and was given appropriate warnings to call her provider with problems or concerns.    Pertinent lab findings included:   Blood type {[facility]:86778}***.     Last three Hgb values:  Lab Results   Component Value Date    HGB 11.9 2025    HGB 10.9 (L) 2025    HGB 10.3 (L) 2024        Problem-specific follow-up plans included the following:  Problem List       Tension type headache    39 weeks gestation of pregnancy    Overview   1 hour GTT elevated, passed 3 hour  [ ]Flu vaccine- declines, [ ] TDAP vaccine - declines  [x]Delivery consent  [ ]Contraception - undecided, may want tubal ligation  F/u repeat growth US at 37 weeks, begin APFS at 37 w [ ]             Grand multiparity with current pregnancy in third trimester    AMA (advanced maternal age) multigravida 35+, third trimester    Insufficient prenatal care in third trimester    Severe obesity due to excess calories affecting pregnancy in third trimester (HCC)    Hx of gestational diabetes in prior pregnancy, currently pregnant, third trimester    Encounter for supervision of high risk pregnancy in third trimester, antepartum    [place] Bravo contractions    Anemia, antepartum, third trimester    Overview   Last hgb 10.3         * (Principal) Preeclampsia, severe    Current Assessment & Plan   - Met criteria due to elevated LFTs  - Started Magnesium 4g, 2g/h    Admit to OBGYN   Clear liquid diet   F/u T&S, CBC, RPR   IVF LR 125cc/hr   Continuous fetal monitoring and tocometry   Analgesia at maternal request   Vertex by TAUS  Induction plan Pitocin            (spontaneous vaginal  "delivery)    Current Assessment & Plan   Routine postpartum care  Encourage ambulation  Encourage familial bonding  Lactation support as needed  Pain: Motrin/Tylenol around the clock               Discharge med list:  Contraception: ***     Medication List      ASK your doctor about these medications     Iron 18 MG/15ML Liqd   methocarbamol 500 mg tablet; Commonly known as: ROBAXIN; Take 1 tablet   (500 mg total) by mouth 4 (four) times a day for 15 doses   PRENATAL 1+1 PO       Condition at discharge:   {condition:15409}     Disposition:   {Discharge Disposition:98914}    Planned Readmission:   {EXAM; YES/NO:03302::\"No\"}      "

## 2025-02-13 NOTE — PLAN OF CARE
Problem: PAIN - ADULT  Goal: Verbalizes/displays adequate comfort level or baseline comfort level  Description: Interventions:  - Encourage patient to monitor pain and request assistance  - Assess pain using appropriate pain scale  - Administer analgesics based on type and severity of pain and evaluate response  - Implement non-pharmacological measures as appropriate and evaluate response  - Consider cultural and social influences on pain and pain management  - Notify physician/advanced practitioner if interventions unsuccessful or patient reports new pain  Outcome: Progressing     Problem: INFECTION - ADULT  Goal: Absence or prevention of progression during hospitalization  Description: INTERVENTIONS:  - Assess and monitor for signs and symptoms of infection  - Monitor lab/diagnostic results  - Monitor all insertion sites, i.e. indwelling lines, tubes, and drains  - Monitor endotracheal if appropriate and nasal secretions for changes in amount and color  - [place] appropriate cooling/warming therapies per order  - Administer medications as ordered  - Instruct and encourage patient and family to use good hand hygiene technique  - Identify and instruct in appropriate isolation precautions for identified infection/condition  Outcome: Progressing  Goal: Absence of fever/infection during neutropenic period  Description: INTERVENTIONS:  - Monitor WBC    Outcome: Progressing     Problem: SAFETY ADULT  Goal: Patient will remain free of falls  Description: INTERVENTIONS:  - Educate patient/family on patient safety including physical limitations  - Instruct patient to call for assistance with activity   - Consult OT/PT to assist with strengthening/mobility   - Keep Call bell within reach  - Keep bed low and locked with side rails adjusted as appropriate  - Keep care items and personal belongings within reach  - Initiate and maintain comfort rounds  - Make Fall Risk Sign visible to staff  - - Apply yellow socks and  bracelet for high fall risk patients  - Consider moving patient to room near nurses station  Outcome: Progressing  Goal: Maintain or return to baseline ADL function  Description: INTERVENTIONS:  -  Assess patient's ability to carry out ADLs; assess patient's baseline for ADL function and identify physical deficits which impact ability to perform ADLs (bathing, care of mouth/teeth, toileting, grooming, dressing, etc.)  - Assess/evaluate cause of self-care deficits   - Assess range of motion  - Assess patient's mobility; develop plan if impaired  - Assess patient's need for assistive devices and provide as appropriate  - Encourage maximum independence but intervene and supervise when necessary  - Involve family in performance of ADLs  - Assess for home care needs following discharge   - Consider OT consult to assist with ADL evaluation and planning for discharge  - Provide patient education as appropriate  Outcome: Progressing  Goal: Maintains/Returns to pre admission functional level  Description: INTERVENTIONS:  - Perform AM-PAC 6 Click Basic Mobility/ Daily Activity assessment daily.  - Set and communicate daily mobility goal to care team and patient/family/caregiver.   - Collaborate with rehabilitation services on mobility goals if consulted  - Out of bed for toileting  - Record patient progress and toleration of activity level   Outcome: Progressing     Problem: Knowledge Deficit  Goal: Patient/family/caregiver demonstrates understanding of disease process, treatment plan, medications, and discharge instructions  Description: Complete learning assessment and assess knowledge base.  Interventions:  - Provide teaching at level of understanding  - Provide teaching via preferred learning methods  Outcome: Progressing     Problem: DISCHARGE PLANNING  Goal: Discharge to home or other facility with appropriate resources  Description: INTERVENTIONS:  - Identify barriers to discharge w/patient and caregiver  - Arrange for  needed discharge resources and transportation as appropriate  - Identify discharge learning needs (meds, wound care, etc.)  - Arrange for interpretive services to assist at discharge as needed  - Refer to Case Management Department for coordinating discharge planning if the patient needs post-hospital services based on physician/advanced practitioner order or complex needs related to functional status, cognitive ability, or social support system  Outcome: Progressing

## 2025-02-13 NOTE — OB LABOR/OXYTOCIN SAFETY PROGRESS
Labor Progress Note - Radha Fonseca 39 y.o. female MRN: [id number]    Unit/Bed#: -01 Encounter: [id number]    Dose (marya-units/min) Oxytocin: 4 marya-units/min  Contraction Frequency (minutes): 1-2  Contraction Intensity: Mild/Moderate  Uterine Activity Characteristics: Regular  Cervical Dilation: 4        Cervical Effacement: 60  Fetal Station: -2  Baseline Rate (FHR): 150 bpm  Fetal Heart Rate (FHT): 150 BPM  FHR Category: 1               Vital Signs:   Vitals:    02/12/25 2101   BP: 127/76   Pulse: 83   Temp:    SpO2:        Notes/comments:         Yardlie Toussaint-Foster, DO 2/12/2025 9:46 PM

## 2025-02-13 NOTE — PLAN OF CARE
Problem: PAIN - ADULT  Goal: Verbalizes/displays adequate comfort level or baseline comfort level  Description: Interventions:  - Encourage patient to monitor pain and request assistance  - Assess pain using appropriate pain scale  - Administer analgesics based on type and severity of pain and evaluate response  - Implement non-pharmacological measures as appropriate and evaluate response  - Consider cultural and social influences on pain and pain management  - Notify physician/advanced practitioner if interventions unsuccessful or patient reports new pain  Outcome: Progressing     Problem: INFECTION - ADULT  Goal: Absence or prevention of progression during hospitalization  Description: INTERVENTIONS:  - Assess and monitor for signs and symptoms of infection  - Monitor lab/diagnostic results  - Monitor all insertion sites, i.e. indwelling lines, tubes, and drains  - Monitor endotracheal if appropriate and nasal secretions for changes in amount and color  - [place] appropriate cooling/warming therapies per order  - Administer medications as ordered  - Instruct and encourage patient and family to use good hand hygiene technique  - Identify and instruct in appropriate isolation precautions for identified infection/condition  Outcome: Progressing  Goal: Absence of fever/infection during neutropenic period  Description: INTERVENTIONS:  - Monitor WBC    Outcome: Progressing     Problem: SAFETY ADULT  Goal: Patient will remain free of falls  Description: INTERVENTIONS:  - Educate patient/family on patient safety including physical limitations  - Instruct patient to call for assistance with activity   - Consult OT/PT to assist with strengthening/mobility   - Keep Call bell within reach  - Keep bed low and locked with side rails adjusted as appropriate  - Keep care items and personal belongings within reach  - Initiate and maintain comfort rounds    - Apply yellow socks and bracelet for high fall risk patients  -  Consider moving patient to room near nurses station  Outcome: Progressing  Goal: Maintain or return to baseline ADL function  Description: INTERVENTIONS:  -  Assess patient's ability to carry out ADLs; assess patient's baseline for ADL function and identify physical deficits which impact ability to perform ADLs (bathing, care of mouth/teeth, toileting, grooming, dressing, etc.)  - Assess/evaluate cause of self-care deficits   - Assess range of motion  - Assess patient's mobility; develop plan if impaired  - Assess patient's need for assistive devices and provide as appropriate  - Encourage maximum independence but intervene and supervise when necessary  - Involve family in performance of ADLs  - Assess for home care needs following discharge   - Consider OT consult to assist with ADL evaluation and planning for discharge  - Provide patient education as appropriate  Outcome: Progressing  Goal: Maintains/Returns to pre admission functional level  Description: INTERVENTIONS:  - Perform AM-PAC 6 Click Basic Mobility/ Daily Activity assessment daily.  - Set and communicate daily mobility goal to care team and patient/family/caregiver.   - Collaborate with rehabilitation services on mobility goals if consulted    Problem: Knowledge Deficit  Goal: Patient/family/caregiver demonstrates understanding of disease process, treatment plan, medications, and discharge instructions  Description: Complete learning assessment and assess knowledge base.  Interventions:  - Provide teaching at level of understanding  - Provide teaching via preferred learning methods  Outcome: Progressing     Problem: DISCHARGE PLANNING  Goal: Discharge to home or other facility with appropriate resources  Description: INTERVENTIONS:  - Identify barriers to discharge w/patient and caregiver  - Arrange for needed discharge resources and transportation as appropriate  - Identify discharge learning needs (meds, wound care, etc.)  - Arrange for interpretive  services to assist at discharge as needed  - Refer to Case Management Department for coordinating discharge planning if the patient needs post-hospital services based on physician/advanced practitioner order or complex needs related to functional status, cognitive ability, or social support system  Outcome: Progressing   nd toleration of activity level   Outcome: Progressing     Problem: Knowledge Deficit  Goal: Patient/family/caregiver demonstrates understanding of disease process, treatment plan, medications, and discharge instructions  Description: Complete learning assessment and assess knowledge base.  Interventions:  - Provide teaching at level of understanding  - Provide teaching via preferred learning methods  Outcome: Progressing

## 2025-02-13 NOTE — OB LABOR/OXYTOCIN SAFETY PROGRESS
Labor Progress Note - Radha Fonseca 39 y.o. female MRN: [id number]    Unit/Bed#: -01 Encounter: [id number]    Dose (marya-units/min) Oxytocin: 6 marya-units/min  Contraction Frequency (minutes): 2-3  Contraction Intensity: Strong  Uterine Activity Characteristics: Regular  Cervical Dilation: 6        Cervical Effacement: 80  Fetal Station: -1  Baseline Rate (FHR): 130 bpm  Fetal Heart Rate (FHT): 130 BPM  FHR Category: 1               Vital Signs:   Vitals:    02/12/25 2101   BP: 127/76   Pulse: 83   Temp:    SpO2:        Notes/comments:         Yardlie Toussaint-Foster, DO 2/12/2025 11:51 PM

## 2025-02-13 NOTE — OB LABOR/OXYTOCIN SAFETY PROGRESS
Labor Progress Note - Radha Fonseca 39 y.o. female MRN: [id number]    Unit/Bed#: -01 Encounter: [id number]    Dose (marya-units/min) Oxytocin: 6 marya-units/min  Contraction Frequency (minutes): 2-5  Contraction Intensity: Mild/Moderate  Uterine Activity Characteristics: Regular  Cervical Dilation: 5        Cervical Effacement: 60  Fetal Station: -1  Baseline Rate (FHR): 150 bpm  Fetal Heart Rate (FHT): 150 BPM  FHR Category: 1               Vital Signs:   Vitals:    02/12/25 2101   BP: 127/76   Pulse: 83   Temp:    SpO2:        Notes/comments:         Yardlie Toussaint-Foster, DO 2/12/2025 10:58 PM

## 2025-02-13 NOTE — L&D DELIVERY NOTE
OBGYN Vaginal Delivery Summary  Radha Fonseca 39 y.o. female MRN: [id number]  Unit/Bed#: -01 Encounter: [id number]    Predelivery Diagnosis:  1. Pregnancy at 39wk1d   2. gHTN  2. Pre-eclampsia w/severe features elevated liver enzymes    Postdelivery Diagnosis:  1. Same as above  2. Delivery of term     Procedure: spontaneous vaginal delivery    Attending: Dr. Toussaint-Foster    Assistant: Dr. Padma Rios    Anesthesia: None    QBL: 0mL  Admission Hg: 10.9g/dL  Admission platelets: 32.5 thousands/uL    Complications: none apparent    Specimens: cord blood, arterial and venous cord blood gases, placenta to storage    Findings:   1. Viable female at 0006, with APGARS of 8 and 9 at 1 and 5 minutes respectively. Weight pending at time of dictation.  2. Spontaneous delivery of intact placenta at 0012. Central  insertion 3 vessel umbilical cord  3. Blood gases:  Umbilical Cord Venous Blood Gas:  Results from last 7 days   Lab Units 25  0012   PH COV  7.332   PCO2 COV mm HG 40.2   HCO3 COV mmol/L 20.8   BASE EXC COV mmol/L -4.7*   O2 CT CD VB mL/dL 15.3   O2 HGB, VENOUS CORD % 66.5     Umbilical Cord Arterial Blood Gas:  Results from last 7 days   Lab Units 25  0012   PH COA  7.288   PCO2 COA  56.1   PO2 COA mm HG 13.1   HCO3 COA mmol/L 26.2   BASE EXC COA mmol/L -1.4*   O2 CONTENT CORD ART ml/dl 3.8   O2 HGB, ARTERIAL CORD % 17.8       Disposition:  Patient tolerated the procedure well and was recovering in labor and delivery room.    Brief history and labor course:  Radha Fonseca is a 39 y.o.  at 39wk1d. She presented to labor and delivery to r/o pre-eclampsia in the setting of gHTN. Patient was diagnosed with pre-eclampsia with severe features elevated liver enzymes. She progressed to complete cervical dilation and began pushing.     Description of procedure  After pushing for 10 seconds, the patient delivered a viable female  at 0006 on 25, weight pending at  time of dictation, apgars of 8 (1 min) and 9 (5 min). The fetal vertex delivered spontaneously. Baby restituted  to DERIK. There was a tight nuchal cord x1 which was doubly clamped and cut. The anterior (right) shoulder delivered atraumatically with maternal expulsive forces and the assistance of gentle downward traction. The posterior shoulder delivered with maternal expulsive forces and the assistance of gentle upward traction. The remainder of the fetus delivered spontaneously.     Upon delivery the infant was placed on the mother's abdomen and delayed cord clamping was performed. The umbilical cord was then doubly clamped and cut. The infant was noted to cry spontaneously and was moving all extremities appropriately. There was no evidence for injury. Awaiting nurse resuscitators evaluated the . Arterial and venous cord blood gases and cord blood were collected for analysis and were promptly sent to the lab. In the immediate post-partum, IV pitocin was administered, and the uterus was noted to contract down well with massage and pitocin. The placenta delivered spontaneously at 0012 and was noted to be intact and had a centrally inserted 3 vessel cord. The placenta was sent to storage.    The vagina, cervix, perineum, and rectum were inspected. No  laceration(s) were noted.     At the conclusion of the procedure, all needle, sponge, and instrument counts were noted to be correct. Patient tolerated the procedure well and was allowed to recover in labor and delivery room with family and  before being transferred to the post-partum floor.         Yardlie Toussaint-Foster, DO  2025  12:48 AM

## 2025-02-14 VITALS
OXYGEN SATURATION: 98 % | BODY MASS INDEX: 39.05 KG/M2 | TEMPERATURE: 97.3 F | HEIGHT: 66 IN | RESPIRATION RATE: 16 BRPM | HEART RATE: 69 BPM | WEIGHT: 243 LBS | SYSTOLIC BLOOD PRESSURE: 110 MMHG | DIASTOLIC BLOOD PRESSURE: 75 MMHG

## 2025-02-14 PROCEDURE — 99024 POSTOP FOLLOW-UP VISIT: CPT | Performed by: OBSTETRICS & GYNECOLOGY

## 2025-02-14 RX ORDER — NIFEDIPINE 60 MG/1
60 TABLET, EXTENDED RELEASE ORAL DAILY
Status: DISCONTINUED | OUTPATIENT
Start: 2025-02-14 | End: 2025-02-14 | Stop reason: HOSPADM

## 2025-02-14 RX ORDER — ACETAMINOPHEN 325 MG/1
650 TABLET ORAL EVERY 4 HOURS PRN
Start: 2025-02-14

## 2025-02-14 RX ORDER — IBUPROFEN 600 MG/1
600 TABLET, FILM COATED ORAL EVERY 6 HOURS
Qty: 30 TABLET | Refills: 0 | Status: SHIPPED | OUTPATIENT
Start: 2025-02-14

## 2025-02-14 RX ADMIN — NIFEDIPINE 60 MG: 60 TABLET, FILM COATED, EXTENDED RELEASE ORAL at 09:02

## 2025-02-14 RX ADMIN — IBUPROFEN 600 MG: 600 TABLET, FILM COATED ORAL at 03:56

## 2025-02-14 RX ADMIN — ACETAMINOPHEN 650 MG: 325 TABLET, FILM COATED ORAL at 17:54

## 2025-02-14 RX ADMIN — ACETAMINOPHEN 650 MG: 325 TABLET, FILM COATED ORAL at 11:21

## 2025-02-14 RX ADMIN — IBUPROFEN 600 MG: 600 TABLET, FILM COATED ORAL at 15:58

## 2025-02-14 RX ADMIN — IBUPROFEN 600 MG: 600 TABLET, FILM COATED ORAL at 09:02

## 2025-02-14 RX ADMIN — BENZOCAINE AND LEVOMENTHOL 1 APPLICATION: 200; 5 SPRAY TOPICAL at 15:58

## 2025-02-14 NOTE — PROGRESS NOTES
"Progress Note - OB/GYN   Name: Radha Fonseca 39 y.o. female I MRN: [id number]  Unit/Bed#: -01 I Date of Admission: 2025   Date of Service: 2025 I Hospital Day: 2     Assessment & Plan  Preeclampsia, severe  - Met criteria due to elevated LFTs  - s/p Magnesium  Hx of gestational diabetes in prior pregnancy, currently pregnant, third trimester     (spontaneous vaginal delivery)  Routine postpartum care  Encourage ambulation  Encourage familial bonding  Lactation support as needed  Pain: Motrin/Tylenol around the clock      Radha Fonseca is a patient of: [facility] (Dr. Neff, Dr. Elias, Dr. Henderson). She is PPD#1 s/p Spontaneous Vaginal Delivery. Recovering well and is stable.     Disposition    - Anticipate discharge home on PPD# 1-2  Barriers to discharge: Blood Pressures     Nely Glover MD  OBGYN PGY-1  2025 6:52 AM    Subjective/Objective     Chief Complaint: Postpartum State     Subjective:    Radha Fonseca has no current complaints and had no acute events overnight. Her pain is well controlled. She is currently voiding. She is ambulating. Patient is currently passing flatus and has had no bowel movement. She is tolerating PO, and denies nausea or vomitting. Patient denies fever, chills, chest pain, shortness of breath, or calf tenderness. Lochia is normal. She is Breastfeeding.    Vitals:   /86 (BP Location: Right arm)   Pulse 73   Temp 98.1 °F (36.7 °C)   Resp 18   Ht 5' 6\" (1.676 m)   Wt 110 kg (243 lb)   LMP 05/15/2024 (Exact Date)   SpO2 98%   Breastfeeding Yes   BMI 39.22 kg/m²     Intake/Output Summary (Last 24 hours) at 2025 0652  Last data filed at 2025 0130  Gross per 24 hour   Intake --   Output 4650 ml   Net -4650 ml       Physical Exam  GEN: Radha Fonseca appears well, alert and oriented x 3, pleasant and cooperative   CARDIO: Regular Rate  RESP:  non-labored breathing  ABDOMEN: soft, no tenderness, firm fundus below " umbilicus  EXTREMITIES: Non tender, trace edema appropriate for post-pregnancy, no erythema    Labs:   Recent Results (from the past 72 hours)   Comprehensive metabolic panel    Collection Time: 02/12/25  3:17 PM   Result Value Ref Range    Sodium 132 (L) 135 - 147 mmol/L    Potassium 3.6 3.5 - 5.3 mmol/L    Chloride 104 96 - 108 mmol/L    CO2 19 (L) 21 - 32 mmol/L    ANION GAP 9 4 - 13 mmol/L    BUN 5 5 - 25 mg/dL    Creatinine 0.49 (L) 0.60 - 1.30 mg/dL    Glucose 143 (H) 65 - 140 mg/dL    Calcium 8.6 8.4 - 10.2 mg/dL     (H) 13 - 39 U/L     (H) 7 - 52 U/L    Alkaline Phosphatase 156 (H) 34 - 104 U/L    Total Protein 6.9 6.4 - 8.4 g/dL    Albumin 3.7 3.5 - 5.0 g/dL    Total Bilirubin 0.51 0.20 - 1.00 mg/dL    eGFR 123 ml/min/1.73sq m   CBC and Platelet    Collection Time: 02/12/25  3:17 PM   Result Value Ref Range    WBC 7.63 4.31 - 10.16 Thousand/uL    RBC 3.55 (L) 3.81 - 5.12 Million/uL    Hemoglobin 10.9 (L) 11.5 - 15.4 g/dL    Hematocrit 32.5 (L) 34.8 - 46.1 %    MCV 92 82 - 98 fL    MCH 30.7 26.8 - 34.3 pg    MCHC 33.5 31.4 - 37.4 g/dL    RDW 13.7 11.6 - 15.1 %    Platelets 146 (L) 149 - 390 Thousands/uL    MPV 13.9 (H) 8.9 - 12.7 fL   Protein / creatinine ratio, urine    Collection Time: 02/12/25  4:07 PM   Result Value Ref Range    Creatinine, Ur 32.9 Reference range not established. mg/dL    Protein Urine Random 5.8 Reference range not established. mg/dL    Prot/Creat Ratio, Ur 0.2 (H) 0.0 - 0.1   Type and screen    Collection Time: 02/12/25  4:59 PM   Result Value Ref Range    ABO Grouping B     Rh Factor Positive     Antibody Screen Negative     Specimen Expiration Date 20250215    RPR-Syphilis Screening (Total Syphilis IGG/IGM)    Collection Time: 02/12/25  4:59 PM   Result Value Ref Range    Treponema Pallidium Total Antibodies Non-reactive Non-reactive   CORD, Blood gas, arterial    Collection Time: 02/13/25 12:12 AM   Result Value Ref Range    pH, Cord Art 7.288 7.230 - 7.430    pCO2,  Cord Art 56.1 30.0 - 60.0    pO2, Cord Art 13.1 5.0 - 25.0 mm HG    HCO3, Cord Art 26.2 17.3 - 27.3 mmol/L    Base Exc, Cord Art -1.4 (L) 3.0 - 11.0 mmol/L    O2 Content, Cord Art 3.8 ml/dl    O2 Hgb, Arterial Cord 17.8 %   CORD, Blood gas, venous    Collection Time: 02/13/25 12:12 AM   Result Value Ref Range    pH, Cord Jose Luis 7.332 7.190 - 7.490    pCO2, Cord Jose Luis 40.2 27.0 - 43.0 mm HG    pO2, Cord Jose Luis 30.7 15.0 - 45.0 mm HG    HCO3, Cord Jose Luis 20.8 12.2 - 28.6 mmol/L    Base Exc, Cord Jose Luis -4.7 (L) 1.0 - 9.0 mmol/L    O2 Cont, Cord Jose Luis 15.3 mL/dL    O2 HGB,VENOUS CORD 66.5 %   CBC    Collection Time: 02/13/25 12:55 AM   Result Value Ref Range    WBC 6.92 4.31 - 10.16 Thousand/uL    RBC 3.92 3.81 - 5.12 Million/uL    Hemoglobin 11.9 11.5 - 15.4 g/dL    Hematocrit 38.3 34.8 - 46.1 %    MCV 98 82 - 98 fL    MCH 30.4 26.8 - 34.3 pg    MCHC 31.1 (L) 31.4 - 37.4 g/dL    RDW 14.0 11.6 - 15.1 %    Platelets 131 (L) 149 - 390 Thousands/uL    MPV 14.0 (H) 8.9 - 12.7 fL   Comprehensive metabolic panel    Collection Time: 02/13/25 12:55 AM   Result Value Ref Range    Sodium 132 (L) 135 - 147 mmol/L    Potassium 3.9 3.5 - 5.3 mmol/L    Chloride 103 96 - 108 mmol/L    CO2 19 (L) 21 - 32 mmol/L    ANION GAP 10 4 - 13 mmol/L    BUN 3 (L) 5 - 25 mg/dL    Creatinine 0.54 (L) 0.60 - 1.30 mg/dL    Glucose 96 65 - 140 mg/dL    Calcium 8.3 (L) 8.4 - 10.2 mg/dL     (H) 13 - 39 U/L     (H) 7 - 52 U/L    Alkaline Phosphatase 170 (H) 34 - 104 U/L    Total Protein 7.3 6.4 - 8.4 g/dL    Albumin 4.0 3.5 - 5.0 g/dL    Total Bilirubin 0.41 0.20 - 1.00 mg/dL    eGFR 120 ml/min/1.73sq m   CBC and Platelet    Collection Time: 02/13/25  6:32 AM   Result Value Ref Range    WBC 11.49 (H) 4.31 - 10.16 Thousand/uL    RBC 3.75 (L) 3.81 - 5.12 Million/uL    Hemoglobin 11.7 11.5 - 15.4 g/dL    Hematocrit 34.9 34.8 - 46.1 %    MCV 93 82 - 98 fL    MCH 31.2 26.8 - 34.3 pg    MCHC 33.5 31.4 - 37.4 g/dL    RDW 14.0 11.6 - 15.1 %    Platelets 144 (L)  149 - 390 Thousands/uL    MPV 13.9 (H) 8.9 - 12.7 fL   Comprehensive metabolic panel    Collection Time: 02/13/25  6:32 AM   Result Value Ref Range    Sodium 132 (L) 135 - 147 mmol/L    Potassium 3.9 3.5 - 5.3 mmol/L    Chloride 103 96 - 108 mmol/L    CO2 22 21 - 32 mmol/L    ANION GAP 7 4 - 13 mmol/L    BUN 3 (L) 5 - 25 mg/dL    Creatinine 0.53 (L) 0.60 - 1.30 mg/dL    Glucose 118 65 - 140 mg/dL    Calcium 7.8 (L) 8.4 - 10.2 mg/dL     (H) 13 - 39 U/L     (H) 7 - 52 U/L    Alkaline Phosphatase 150 (H) 34 - 104 U/L    Total Protein 6.8 6.4 - 8.4 g/dL    Albumin 3.8 3.5 - 5.0 g/dL    Total Bilirubin 0.36 0.20 - 1.00 mg/dL    eGFR 120 ml/min/1.73sq m   CBC and Platelet    Collection Time: 02/13/25  6:33 PM   Result Value Ref Range    WBC 8.57 4.31 - 10.16 Thousand/uL    RBC 3.82 3.81 - 5.12 Million/uL    Hemoglobin 11.6 11.5 - 15.4 g/dL    Hematocrit 35.3 34.8 - 46.1 %    MCV 92 82 - 98 fL    MCH 30.4 26.8 - 34.3 pg    MCHC 32.9 31.4 - 37.4 g/dL    RDW 14.1 11.6 - 15.1 %    Platelets 161 149 - 390 Thousands/uL    MPV 13.6 (H) 8.9 - 12.7 fL   Comprehensive metabolic panel    Collection Time: 02/13/25  6:33 PM   Result Value Ref Range    Sodium 132 (L) 135 - 147 mmol/L    Potassium 3.9 3.5 - 5.3 mmol/L    Chloride 102 96 - 108 mmol/L    CO2 21 21 - 32 mmol/L    ANION GAP 9 4 - 13 mmol/L    BUN 3 (L) 5 - 25 mg/dL    Creatinine 0.55 (L) 0.60 - 1.30 mg/dL    Glucose 112 65 - 140 mg/dL    Calcium 7.4 (L) 8.4 - 10.2 mg/dL     (H) 13 - 39 U/L     (H) 7 - 52 U/L    Alkaline Phosphatase 160 (H) 34 - 104 U/L    Total Protein 7.4 6.4 - 8.4 g/dL    Albumin 4.0 3.5 - 5.0 g/dL    Total Bilirubin 0.28 0.20 - 1.00 mg/dL    eGFR 119 ml/min/1.73sq m     Meds:    Current Facility-Administered Medications:     acetaminophen (TYLENOL) tablet 650 mg, 650 mg, Oral, Q4H PRN, Yardlie Toussaint-Foster, DO, 650 mg at 02/13/25 0448    benzocaine-menthol-lanolin-aloe (DERMOPLAST) 20-0.5 % topical spray 1 Application, 1  Application, Topical, Q6H PRN, Yardlie Toussaint-Foster, DO, 1 Application at 02/13/25 0150    bupivacaine (PF) (MARCAINE) 0.25 % injection 30 mL, 30 mL, Infiltration, Once PRN, Nely Glover MD    calcium gluconate 10 % injection 1 g, 1 g, Intravenous, Once PRN, Feroz Coy MD    ibuprofen (MOTRIN) tablet 600 mg, 600 mg, Oral, Q6H, Yardlie Toussaint-Foster, DO, 600 mg at 02/14/25 0356    lactated ringers infusion, 50 mL/hr, Intravenous, Continuous, Feroz Coy MD, Last Rate: 50 mL/hr at 02/13/25 1346, 50 mL/hr at 02/13/25 1346    NIFEdipine (PROCARDIA XL) 24 hr tablet 60 mg, 60 mg, Oral, Daily, Nely Glover MD    ondansetron (ZOFRAN) injection 4 mg, 4 mg, Intravenous, Q6H PRN, Nely Glover MD    simethicone (MYLICON) chewable tablet 80 mg, 80 mg, Oral, 4x Daily PRN, Yardlie Toussaint-Foster, DO    sodium chloride (OCEAN) 0.65 % nasal spray 1 spray, 1 spray, Each Nare, Q1H PRN, Sumaya Martin MD, 1 spray at 02/13/25 1529    witch hazel-glycerin (TUCKS) topical pad 1 Pad, 1 Pad, Topical, Q4H PRN, Yardlie Toussaint-Foster, DO, 1 Pad at 02/13/25 0150

## 2025-02-14 NOTE — PLAN OF CARE
Problem: PAIN - ADULT  Goal: Verbalizes/displays adequate comfort level or baseline comfort level  Description: Interventions:  - Encourage patient to monitor pain and request assistance  - Assess pain using appropriate pain scale  - Administer analgesics based on type and severity of pain and evaluate response  - Implement non-pharmacological measures as appropriate and evaluate response  - Consider cultural and social influences on pain and pain management  - Notify physician/advanced practitioner if interventions unsuccessful or patient reports new pain  Outcome: Progressing     Problem: INFECTION - ADULT  Goal: Absence of fever/infection during neutropenic period  Description: INTERVENTIONS:  - Monitor WBC    Outcome: Progressing     Problem: SAFETY ADULT  Goal: Maintain or return to baseline ADL function  Description: INTERVENTIONS:  -  Assess patient's ability to carry out ADLs; assess patient's baseline for ADL function and identify physical deficits which impact ability to perform ADLs (bathing, care of mouth/teeth, toileting, grooming, dressing, etc.)  - Assess/evaluate cause of self-care deficits   - Assess range of motion  - Assess patient's mobility; develop plan if impaired  - Assess patient's need for assistive devices and provide as appropriate  - Encourage maximum independence but intervene and supervise when necessary  - Involve family in performance of ADLs  - Assess for home care needs following discharge   - Consider OT consult to assist with ADL evaluation and planning for discharge  - Provide patient education as appropriate  Outcome: Progressing     Problem: Knowledge Deficit  Goal: Patient/family/caregiver demonstrates understanding of disease process, treatment plan, medications, and discharge instructions  Description: Complete learning assessment and assess knowledge base.  Interventions:  - Provide teaching at level of understanding  - Provide teaching via preferred learning  methods  Outcome: Progressing     Problem: DISCHARGE PLANNING  Goal: Discharge to home or other facility with appropriate resources  Description: INTERVENTIONS:  - Identify barriers to discharge w/patient and caregiver  - Arrange for needed discharge resources and transportation as appropriate  - Identify discharge learning needs (meds, wound care, etc.)  - Arrange for interpretive services to assist at discharge as needed  - Refer to Case Management Department for coordinating discharge planning if the patient needs post-hospital services based on physician/advanced practitioner order or complex needs related to functional status, cognitive ability, or social support system  Outcome: Progressing     Problem: POSTPARTUM  Goal: Experiences normal postpartum course  Description: INTERVENTIONS:  - Monitor maternal vital signs  - Assess uterine involution and lochia  Outcome: Progressing  Goal: Appropriate maternal -  bonding  Description: INTERVENTIONS:  - Identify family support  - Assess for appropriate maternal/infant bonding   -Encourage maternal/infant bonding opportunities  - Referral to  or  as needed  Outcome: Progressing  Goal: Establishment of infant feeding pattern  Description: INTERVENTIONS:  - Assess breast/bottle feeding  - Refer to lactation as needed  Outcome: Progressing  Goal: Incision(s), wounds(s) or drain site(s) healing without S/S of infection  Description: INTERVENTIONS  - Assess and document dressing, incision, wound bed, drain sites and surrounding tissue  - Provide patient and family education  - Perform skin care/dressing changes every day  Outcome: Progressing

## 2025-02-14 NOTE — PLAN OF CARE
Problem: PAIN - ADULT  Goal: Verbalizes/displays adequate comfort level or baseline comfort level  Description: Interventions:  - Encourage patient to monitor pain and request assistance  - Assess pain using appropriate pain scale  - Administer analgesics based on type and severity of pain and evaluate response  - Implement non-pharmacological measures as appropriate and evaluate response  - Consider cultural and social influences on pain and pain management  - Notify physician/advanced practitioner if interventions unsuccessful or patient reports new pain  Outcome: Progressing     Problem: INFECTION - ADULT  Goal: Absence or prevention of progression during hospitalization  Description: INTERVENTIONS:  - Assess and monitor for signs and symptoms of infection  - Monitor lab/diagnostic results  - Monitor all insertion sites, i.e. indwelling lines, tubes, and drains  - Monitor endotracheal if appropriate and nasal secretions for changes in amount and color  - [place] appropriate cooling/warming therapies per order  - Administer medications as ordered  - Instruct and encourage patient and family to use good hand hygiene technique  - Identify and instruct in appropriate isolation precautions for identified infection/condition  Outcome: Progressing  Goal: Absence of fever/infection during neutropenic period  Description: INTERVENTIONS:  - Monitor WBC    Outcome: Progressing     Problem: SAFETY ADULT  Goal: Patient will remain free of falls  Description: INTERVENTIONS:  - Educate patient/family on patient safety including physical limitations  - Instruct patient to call for assistance with activity   - Consult OT/PT to assist with strengthening/mobility   - Keep Call bell within reach  - Keep bed low and locked with side rails adjusted as appropriate  - Keep care items and personal belongings within reach  - Initiate and maintain comfort rounds  - Make Fall Risk Sign visible to staff  - Apply yellow socks and bracelet  for high fall risk patients  - Consider moving patient to room near nurses station  Outcome: Progressing  Goal: Maintain or return to baseline ADL function  Description: INTERVENTIONS:  -  Assess patient's ability to carry out ADLs; assess patient's baseline for ADL function and identify physical deficits which impact ability to perform ADLs (bathing, care of mouth/teeth, toileting, grooming, dressing, etc.)  - Assess/evaluate cause of self-care deficits   - Assess range of motion  - Assess patient's mobility; develop plan if impaired  - Assess patient's need for assistive devices and provide as appropriate  - Encourage maximum independence but intervene and supervise when necessary  - Involve family in performance of ADLs  - Assess for home care needs following discharge   - Consider OT consult to assist with ADL evaluation and planning for discharge  - Provide patient education as appropriate  Outcome: Progressing  Goal: Maintains/Returns to pre admission functional level  Description: INTERVENTIONS:  - Perform AM-PAC 6 Click Basic Mobility/ Daily Activity assessment daily.  - Set and communicate daily mobility goal to care team and patient/family/caregiver.   - Collaborate with rehabilitation services on mobility goals if consulted  - Out of bed for toileting  - Record patient progress and toleration of activity level   Outcome: Progressing     Problem: Knowledge Deficit  Goal: Patient/family/caregiver demonstrates understanding of disease process, treatment plan, medications, and discharge instructions  Description: Complete learning assessment and assess knowledge base.  Interventions:  - Provide teaching at level of understanding  - Provide teaching via preferred learning methods  Outcome: Progressing     Problem: DISCHARGE PLANNING  Goal: Discharge to home or other facility with appropriate resources  Description: INTERVENTIONS:  - Identify barriers to discharge w/patient and caregiver  - Arrange for needed  discharge resources and transportation as appropriate  - Identify discharge learning needs (meds, wound care, etc.)  - Arrange for interpretive services to assist at discharge as needed  - Refer to Case Management Department for coordinating discharge planning if the patient needs post-hospital services based on physician/advanced practitioner order or complex needs related to functional status, cognitive ability, or social support system  Outcome: Progressing

## 2025-02-15 NOTE — NURSING NOTE
Provided discharge packet and save your life magnent to pt.  Explained magnet as well as packet contents to patients satisfaction.  No further questions at this time.

## 2025-02-20 ENCOUNTER — TELEPHONE (OUTPATIENT)
Dept: OBGYN CLINIC | Facility: CLINIC | Age: 40
End: 2025-02-20

## 2025-02-20 NOTE — TELEPHONE ENCOUNTER
POSTPARTUM PHONE CALL ASSESSMENT    Date of Delivery: 2025  Delivering Provider: Toussaint-Foster  Mode:   Delivery Notes/Complications: No complications    Do you still have bleeding?  Yes, light bleeding  Are you experiencing pain? Yes, mild perineal pain   If yes, managing pain with: Ibuprofen  Regular BMs/Urination? Yes  Breastfeeding/Formula/Both? Breastfeeding  How are you doing emotionally? Patient states she is doing well.     Do you have any other questions or concerns for us or your provider? No     Have you scheduled the pediatrician appointment with pediatrician? Yes    Postpartum Appointment  Appointment Date: 3/11/2025   Provider:  Dr. Elias  BP check scheduled for 2025

## 2025-02-21 ENCOUNTER — POSTPARTUM VISIT (OUTPATIENT)
Dept: OBGYN CLINIC | Facility: CLINIC | Age: 40
End: 2025-02-21

## 2025-02-21 VITALS
DIASTOLIC BLOOD PRESSURE: 80 MMHG | HEIGHT: 66 IN | WEIGHT: 226.2 LBS | BODY MASS INDEX: 36.35 KG/M2 | SYSTOLIC BLOOD PRESSURE: 140 MMHG

## 2025-02-21 PROBLEM — Z86.32 HX OF GESTATIONAL DIABETES IN PRIOR PREGNANCY, CURRENTLY PREGNANT, THIRD TRIMESTER: Status: RESOLVED | Noted: 2024-11-15 | Resolved: 2025-02-21

## 2025-02-21 PROBLEM — O09.523 AMA (ADVANCED MATERNAL AGE) MULTIGRAVIDA 35+, THIRD TRIMESTER: Status: RESOLVED | Noted: 2024-09-10 | Resolved: 2025-02-21

## 2025-02-21 PROBLEM — O99.213 SEVERE OBESITY DUE TO EXCESS CALORIES AFFECTING PREGNANCY IN THIRD TRIMESTER (HCC): Status: RESOLVED | Noted: 2024-11-14 | Resolved: 2025-02-21

## 2025-02-21 PROBLEM — E66.01 SEVERE OBESITY DUE TO EXCESS CALORIES AFFECTING PREGNANCY IN THIRD TRIMESTER (HCC): Status: RESOLVED | Noted: 2024-11-14 | Resolved: 2025-02-21

## 2025-02-21 PROBLEM — Z3A.39 39 WEEKS GESTATION OF PREGNANCY: Status: RESOLVED | Noted: 2024-08-13 | Resolved: 2025-02-21

## 2025-02-21 PROBLEM — O09.93 ENCOUNTER FOR SUPERVISION OF HIGH RISK PREGNANCY IN THIRD TRIMESTER, ANTEPARTUM: Status: RESOLVED | Noted: 2025-01-23 | Resolved: 2025-02-21

## 2025-02-21 PROBLEM — O99.013 ANEMIA, ANTEPARTUM, THIRD TRIMESTER: Status: RESOLVED | Noted: 2025-02-03 | Resolved: 2025-02-21

## 2025-02-21 PROBLEM — O09.33 INSUFFICIENT PRENATAL CARE IN THIRD TRIMESTER: Status: RESOLVED | Noted: 2024-10-18 | Resolved: 2025-02-21

## 2025-02-21 PROBLEM — O09.293 HX OF GESTATIONAL DIABETES IN PRIOR PREGNANCY, CURRENTLY PREGNANT, THIRD TRIMESTER: Status: RESOLVED | Noted: 2024-11-15 | Resolved: 2025-02-21

## 2025-02-21 PROBLEM — O09.43 GRAND MULTIPARITY WITH CURRENT PREGNANCY IN THIRD TRIMESTER: Status: RESOLVED | Noted: 2024-09-10 | Resolved: 2025-02-21

## 2025-02-21 PROCEDURE — 99024 POSTOP FOLLOW-UP VISIT: CPT | Performed by: OBSTETRICS & GYNECOLOGY

## 2025-02-21 NOTE — PROGRESS NOTES
"Subjective    Patient is a 40 yo  who presents today s/p  on 25. She met criteria for preeclampsia with severe features during her labor course. Her perineum was intact following delivery. She was discharged on Procardia XL 60 mg daily for blood pressure control. She is breastfeeding. She otherwise feels well and has no concerns.    OB History          8    Para   6    Term   6       0    AB   2    Living   6         SAB   1    IAB   0    Ectopic   1    Multiple   0    Live Births   6                        Objective    Vitals:    25 1428   BP: 140/80   BP Location: Right arm   Patient Position: Sitting   Cuff Size: Standard   Weight: 103 kg (226 lb 3.2 oz)   Height: 5' 6\" (1.676 m)        Physical Exam  Constitutional:       Appearance: She is well-developed.   Cardiovascular:      Rate and Rhythm: Normal rate.   Pulmonary:      Effort: Pulmonary effort is normal. No respiratory distress.   Abdominal:      Palpations: Abdomen is soft.      Tenderness: There is no abdominal tenderness.   Skin:     General: Skin is warm and dry.          Assessment    Patient Active Problem List   Diagnosis    Tension type headache    [place] contractions    Preeclampsia, severe        Plan  - EPDS 0  - Continue Procardia 60 mg daily; patient reports normal BP at home, will re-evaluate at next visit  - Contraception: considering tubal ligation, discuss at next visit  "

## 2025-02-23 LAB — PLACENTA IN STORAGE: NORMAL

## 2025-02-24 ENCOUNTER — TELEPHONE (OUTPATIENT)
Dept: OBGYN CLINIC | Facility: CLINIC | Age: 40
End: 2025-02-24

## 2025-02-24 NOTE — TELEPHONE ENCOUNTER
Patient has been noncompliant in BP monitoring program.  Has not submitted blood pressure readings since 2/20.  Called to remind patient to submit readings.  Left reminder message on voicemail to submit BP readings twice daily.

## 2025-02-25 ENCOUNTER — TELEPHONE (OUTPATIENT)
Dept: OBGYN CLINIC | Facility: CLINIC | Age: 40
End: 2025-02-25

## 2025-03-04 NOTE — TELEPHONE ENCOUNTER
[employer] message sent and letter mailed to patient.   
Patient has been noncompliant in BP monitoring program.  Has not submitted blood pressure readings since 2/20/2025.  Called to remind patient to submit readings.  Left reminder message on voicemail to submit BP readings twice daily.    
Patient has been noncompliant in BP monitoring program.  Has not submitted blood pressure readings since 2/27.  Called to remind patient to submit readings.  Spoke to patient.  Reviewed instructions for submitting readings. Verbalized understanding and will submit BP readings.    
Patient has been noncompliant in BP monitoring program.  Has not submitted blood pressure readings since 2/27/25.  Called to remind patient to submit readings.  Spoke to patient.  Reviewed instructions for submitting readings. Verbalized understanding and will submit BP readings.      
0 = understands/communicates without difficulty

## 2025-03-10 ENCOUNTER — TELEPHONE (OUTPATIENT)
Facility: HOSPITAL | Age: 40
End: 2025-03-10

## 2025-03-10 DIAGNOSIS — O14.13 SEVERE PRE-ECLAMPSIA IN THIRD TRIMESTER: ICD-10-CM

## 2025-03-10 NOTE — TELEPHONE ENCOUNTER
Radha is part of postpartum home BP monitoring program with a HR 55 noted and /65 within normal. Today without any complaints. Does take Procardia daily. Has a follow up with her physician tomorrow 3/11/2025.

## 2025-03-11 ENCOUNTER — POSTPARTUM VISIT (OUTPATIENT)
Dept: OBGYN CLINIC | Facility: CLINIC | Age: 40
End: 2025-03-11

## 2025-03-11 VITALS
SYSTOLIC BLOOD PRESSURE: 124 MMHG | BODY MASS INDEX: 36.22 KG/M2 | DIASTOLIC BLOOD PRESSURE: 82 MMHG | HEART RATE: 89 BPM | HEIGHT: 66 IN | WEIGHT: 225.4 LBS | OXYGEN SATURATION: 99 %

## 2025-03-11 DIAGNOSIS — Z01.818 PRE-OP EXAMINATION: ICD-10-CM

## 2025-03-11 PROCEDURE — 99024 POSTOP FOLLOW-UP VISIT: CPT | Performed by: OBSTETRICS & GYNECOLOGY

## 2025-03-11 PROCEDURE — PREOP: Performed by: OBSTETRICS & GYNECOLOGY

## 2025-03-11 NOTE — PROGRESS NOTES
H&P Exam  Radha Fonseca 39 y.o. female MRN: [id number]  Unit/Bed#:  Encounter: [id number]      HPI:  Radha Fonseca is a 39 y.o.  female who will be undergoing exam under anesthesia and bilateral salpingectomy on 4/10/25. She would like permanent sterilization.    Contraception: none  Last pap smear:  NILM/HPV neg    Blood use during admission if needed: yes  CPR during admission if needed: yes    OB History    Para Term  AB Living   8 6 6 0 2 6   SAB IAB Ectopic Multiple Live Births   1 0 1 0 6      # Outcome Date GA Lbr Cesar/2nd Weight Sex Type Anes PTL Lv   8 Term 25 39w1d  3460 g (7 lb 10.1 oz) F Vag-Spont None N EDER   7 Term 23 40w4d / 00:01 3350 g (7 lb 6.2 oz) F Vag-Spont EPI  EDER   6 Term 2020 40w6d  3921 g (8 lb 10.3 oz) M Vag-Spont  N EDER   5 Term 2019 39w1d  3487 g (7 lb 11 oz) M Vag-Spont  N EDER   4 Ectopic 2017              Birth Comments: ? chemical pregnancy   3 Term 2011 40w0d  3751 g (8 lb 4.3 oz) F Vag-Spont  N EDER   2 Term 10/2009 40w0d  2892 g (6 lb 6 oz) M Vag-Spont  N EDER   1 SAB      SAB         Birth Comments: D&C       Review of Systems   Constitutional:  Negative for chills and fever.   Respiratory:  Negative for cough, shortness of breath and wheezing.    Cardiovascular:  Negative for chest pain.   Gastrointestinal:  Negative for abdominal pain, nausea and vomiting.   Genitourinary:  Negative for dysuria, hematuria, urgency, vaginal bleeding and vaginal discharge.   Neurological:  Negative for dizziness, weakness, light-headedness and headaches.       Historical Information   Past Medical History:   Diagnosis Date    Abnormal Pap smear of cervix     , ASCUS/ neg HPV    Chlamydia         Ectopic pregnancy     Gestational hypertension 2023    H/O hernia repair 10/2015    History of cholecystectomy 2015    History of gestational diabetes 2018    insulin dependent    Miscarriage     Varicella      Past Surgical  "History:   Procedure Laterality Date    CHOLECYSTECTOMY      DILATION AND CURETTAGE OF UTERUS  2008     Social History   Social History     Substance and Sexual Activity   Alcohol Use Not Currently     Social History     Substance and Sexual Activity   Drug Use Never     Social History     Tobacco Use   Smoking Status Never   Smokeless Tobacco Never     Family History: denies history of breast, ovarian, uterine, cervical cancer.    Meds/Allergies    Not in a hospital admission.     Allergies   Allergen Reactions    Sesame Seed (Diagnostic) - Food Allergy Anaphylaxis       OBJECTIVE:    Vitals: Blood pressure 124/82, pulse 89, height 5' 6\" (1.676 m), weight 102 kg (225 lb 6.4 oz), last menstrual period 05/15/2024, SpO2 99%, currently breastfeeding.Body mass index is 36.38 kg/m².    Physical Exam  Vitals reviewed.   Constitutional:       Appearance: Normal appearance.   Cardiovascular:      Rate and Rhythm: Normal rate.   Pulmonary:      Effort: Pulmonary effort is normal.   Abdominal:      Palpations: Abdomen is soft.   Musculoskeletal:         General: No swelling or tenderness.   Skin:     General: Skin is warm and dry.   Neurological:      Mental Status: She is alert and oriented to person, place, and time.   Psychiatric:         Mood and Affect: Mood normal.         Assessment & Plan     ASSESSMENT:  38yo  female who is scheduled for exam under anesthesia and bilateral salpingectomy on 4/10/25.    PLAN:    NPO after midnight, Hibiclens given to patient   No surgical prophylaxis    Consent signed 3/11/25  The patient was counseled regarding indications, risks, benefits and alternatives to surgical management.  We discussed risks including but not limited to bleeding and potential need for blood transfusions, infection, pain, injury to surrounding organs such as bladder, intestines, ureters and neurovascular structures.  Patient understands potential risks associated with stress of surgery and general " anesthesia including but not limited to acute cardiac events, venous thromboembolism, etc.  All questions answered to patient's satisfaction.  Patient agrees and wants to proceed. Discussed entry at [place] due to mesh at umbilicus.    Does not need MA-31 with insurance.       Kia Elias MD  3/11/2025  2:50 PM

## 2025-03-11 NOTE — PROGRESS NOTES
Subjective     Radha Fonseca is a 39 y.o. y.o. female  who presents for a postpartum visit. She is 4 weeks postpartum following a spontaneous vaginal delivery on 25. I have fully reviewed the prenatal and intrapartum course. The delivery was at 39 gestational weeks. Outcome: spontaneous vaginal delivery. Anesthesia: none. Postpartum course has been complicated by pre-eclampsia. Baby's course has been uncomplicated. Baby is feeding by breast. Bleeding staining only. Bowel function is normal. Bladder function is normal. Patient is sexually active. Contraception method is none. Postpartum depression screening: negative.    The following portions of the patient's history were reviewed and updated as appropriate: allergies, current medications, past family history, past medical history, past social history, past surgical history, and problem list.    Review of Systems  Pertinent items are noted in HPI..    Objective   Menstrual History:  OB History          8    Para   6    Term   6       0    AB   2    Living   6         SAB   1    IAB   0    Ectopic   1    Multiple   0    Live Births   6                Patient's last menstrual period was 05/15/2024 (exact date).            Vitals:    25 1409   BP: 124/82   Pulse: 89   SpO2: 99%       Physical Exam  Constitutional:       Appearance: She is well-developed.   Cardiovascular:      Rate and Rhythm: Normal rate and regular rhythm.      Heart sounds: Normal heart sounds. No murmur heard.     No friction rub. No gallop.   Pulmonary:      Effort: Pulmonary effort is normal. No respiratory distress.      Breath sounds: No wheezing.   Abdominal:      Palpations: Abdomen is soft.      Tenderness: There is no abdominal tenderness.   Musculoskeletal:         General: No tenderness.   Neurological:      Mental Status: She is alert and oriented to person, place, and time.   Vitals reviewed.         Assessment/Plan     Normal postpartum exam.    Pre-eclampsia: Pt stopped Procardia a week ago. She reports normotensive blood pressures at home. Will continue to monitor blood pressures off of medication.  Pt interested in sterilization for birth control.

## 2025-03-12 ENCOUNTER — TELEPHONE (OUTPATIENT)
Dept: OBGYN CLINIC | Facility: CLINIC | Age: 40
End: 2025-03-12

## 2025-03-12 NOTE — TELEPHONE ENCOUNTER
[facility] OB GYN Department  Surgery Scheduling Sheet    Patient Name: Radha Fonseca  : 1985    Provider: Kia Elias MD     Needed: no; Language: N/A    Procedure: exam under anesthesia and bilateral salpingectomy    Diagnosis: request for sterilization    Special Needs or Equipment: none    Anesthesia: General anesthesia    Length of stay: outpatient  Does patient have comorbid conditions that will require close perioperative monitoring prior to safe discharge: no    The patient has comorbid conditions that will require close perioperative monitoring prior to safe discharge, including N/A.  This may require acute care beyond the usual and routine recovery period. As such, inpatient admission post-operatively is expected and appropriate, and anticipated hospital length of stay will be >2 midnights.    Pre-Admission Testing Needed: yes  Labs that should be ordered: cbc, type and screen, and urine pregnancy test    Order PAT that is recommended in prep for procedure?: Yes    Medical Clearance Needed: no; Provider: N/A    MA Form Signed (tubals/hysterectomy): Not Indicated-private insurance    Surgical Drink Given: no    How many days out of work: 3 day(s)    How many days no drivin day(s)      Is pre op appt needed?  no  Interval for post op appt: 2 week(s)      For Surgical Scheduler:    Surgery Scheduled On:  [place]: [facility]    Pre-op Appt:  Post op Appt:  Consult/Medical clearance appt:

## 2025-03-12 NOTE — TELEPHONE ENCOUNTER
Talked to patient she is scheduled for her surgical procedure on 4/10/2025 with Dr. Elias at the [facility]. Patient is scheduled for her post op appointment on 4/29/2025 at the [facility].

## 2025-04-08 ENCOUNTER — ANESTHESIA EVENT (OUTPATIENT)
Dept: PERIOP | Facility: HOSPITAL | Age: 40
End: 2025-04-08
Payer: COMMERCIAL

## 2025-04-08 RX ORDER — ACETAMINOPHEN 10 MG/ML
1000 INJECTION, SOLUTION INTRAVENOUS ONCE
Status: CANCELLED | OUTPATIENT
Start: 2025-04-10 | End: 2025-04-08

## 2025-04-09 NOTE — PRE-PROCEDURE INSTRUCTIONS
Pre-Surgery Instructions:   Medication Instructions    Prenatal Vit-Fe Fumarate-FA (PRENATAL 1+1 PO) Stop taking 7 days prior to surgery.    Medication instructions for day of surgery reviewed. Please take all instructed medications with only a sip of water.       You will receive a call one business day prior to surgery with an arrival time and hospital directions. If your surgery is scheduled on a Monday, the hospital will be calling you on the Friday prior to your surgery. If you have not heard from anyone by 8pm, please call the hospital supervisor through the hospital  at [phone]. ([place] [phone] or [place] [phone]).    Do not eat or drink anything after midnight the night before your surgery, including candy, mints, lifesavers, or chewing gum. Do not drink alcohol 24hrs before your surgery. Try not to smoke at least 24hrs before your surgery.       Follow the pre surgery showering instructions as listed in the “My Surgical Experience Booklet” or otherwise provided by your surgeon's office. Do not use a blade to shave the surgical area 1 week before surgery. It is okay to use a clean electric clippers up to 24 hours before surgery. Do not apply any lotions, creams, including makeup, cologne, deodorant, or perfumes after showering on the day of your surgery. Do not use dry shampoo, hair spray, hair gel, or any type of hair products.     No contact lenses, eye make-up, or artificial eyelashes. Remove nail polish, including gel polish, and any artificial, gel, or acrylic nails if possible. Remove all jewelry including rings and body piercing jewelry.     Wear causal clothing that is easy to take on and off. Consider your type of surgery.    Keep any valuables, jewelry, piercings at home. Please bring any specially ordered equipment (sling, braces) if indicated.    Arrange for a responsible person to drive you to and from the hospital on the day of your surgery. Please confirm the visitor  policy for the day of your procedure when you receive your phone call with an arrival time.     Call the surgeon's office with any new illnesses, exposures, or additional questions prior to surgery.    Please reference your “My Surgical Experience Booklet” for additional information to prepare for your upcoming surgery.

## 2025-04-10 ENCOUNTER — ANESTHESIA (OUTPATIENT)
Dept: PERIOP | Facility: HOSPITAL | Age: 40
End: 2025-04-10
Payer: COMMERCIAL

## 2025-04-10 ENCOUNTER — HOSPITAL ENCOUNTER (OUTPATIENT)
Facility: HOSPITAL | Age: 40
Setting detail: OUTPATIENT SURGERY
Discharge: HOME | End: 2025-04-10
Attending: OBSTETRICS & GYNECOLOGY | Admitting: OBSTETRICS & GYNECOLOGY
Payer: COMMERCIAL

## 2025-04-10 VITALS
HEIGHT: 66 IN | SYSTOLIC BLOOD PRESSURE: 149 MMHG | HEART RATE: 71 BPM | TEMPERATURE: 97.1 F | WEIGHT: 221.56 LBS | RESPIRATION RATE: 16 BRPM | OXYGEN SATURATION: 94 % | DIASTOLIC BLOOD PRESSURE: 82 MMHG | BODY MASS INDEX: 35.61 KG/M2

## 2025-04-10 DIAGNOSIS — Z90.79 STATUS POST BILATERAL SALPINGECTOMY: Primary | ICD-10-CM

## 2025-04-10 DIAGNOSIS — Z30.2 REQUEST FOR STERILIZATION: ICD-10-CM

## 2025-04-10 LAB
ERYTHROCYTE [DISTWIDTH] IN BLOOD BY AUTOMATED COUNT: 11.9 % (ref 11.6–15.1)
EXT PREGNANCY TEST URINE: NEGATIVE
EXT. CONTROL: NORMAL
HCT VFR BLD AUTO: 35.3 % (ref 34.8–46.1)
HGB BLD-MCNC: 11.8 G/DL (ref 11.5–15.4)
MCH RBC QN AUTO: 31 PG (ref 26.8–34.3)
MCHC RBC AUTO-ENTMCNC: 33.4 G/DL (ref 31.4–37.4)
MCV RBC AUTO: 93 FL (ref 82–98)
PLATELET # BLD AUTO: 228 THOUSANDS/UL (ref 149–390)
PMV BLD AUTO: 12.2 FL (ref 8.9–12.7)
RBC # BLD AUTO: 3.81 MILLION/UL (ref 3.81–5.12)
WBC # BLD AUTO: 6.2 THOUSAND/UL (ref 4.31–10.16)

## 2025-04-10 PROCEDURE — 88302 TISSUE EXAM BY PATHOLOGIST: CPT | Performed by: PATHOLOGY

## 2025-04-10 PROCEDURE — 81025 URINE PREGNANCY TEST: CPT | Performed by: OBSTETRICS & GYNECOLOGY

## 2025-04-10 PROCEDURE — 58661 LAPAROSCOPY REMOVE ADNEXA: CPT | Performed by: OBSTETRICS & GYNECOLOGY

## 2025-04-10 PROCEDURE — 85027 COMPLETE CBC AUTOMATED: CPT | Performed by: OBSTETRICS & GYNECOLOGY

## 2025-04-10 RX ORDER — FENTANYL CITRATE 50 UG/ML
INJECTION, SOLUTION INTRAMUSCULAR; INTRAVENOUS AS NEEDED
Status: DISCONTINUED | OUTPATIENT
Start: 2025-04-10 | End: 2025-04-10

## 2025-04-10 RX ORDER — SODIUM CHLORIDE, SODIUM LACTATE, POTASSIUM CHLORIDE, CALCIUM CHLORIDE 600; 310; 30; 20 MG/100ML; MG/100ML; MG/100ML; MG/100ML
125 INJECTION, SOLUTION INTRAVENOUS CONTINUOUS
Status: DISCONTINUED | OUTPATIENT
Start: 2025-04-10 | End: 2025-04-10

## 2025-04-10 RX ORDER — DEXAMETHASONE SODIUM PHOSPHATE 10 MG/ML
INJECTION, SOLUTION INTRAMUSCULAR; INTRAVENOUS AS NEEDED
Status: DISCONTINUED | OUTPATIENT
Start: 2025-04-10 | End: 2025-04-10

## 2025-04-10 RX ORDER — MIDAZOLAM HYDROCHLORIDE 2 MG/2ML
INJECTION, SOLUTION INTRAMUSCULAR; INTRAVENOUS AS NEEDED
Status: DISCONTINUED | OUTPATIENT
Start: 2025-04-10 | End: 2025-04-10

## 2025-04-10 RX ORDER — FENTANYL CITRATE/PF 50 MCG/ML
25 SYRINGE (ML) INJECTION
Status: DISCONTINUED | OUTPATIENT
Start: 2025-04-10 | End: 2025-04-10 | Stop reason: HOSPADM

## 2025-04-10 RX ORDER — KETOROLAC TROMETHAMINE 30 MG/ML
INJECTION, SOLUTION INTRAMUSCULAR; INTRAVENOUS AS NEEDED
Status: DISCONTINUED | OUTPATIENT
Start: 2025-04-10 | End: 2025-04-10

## 2025-04-10 RX ORDER — ACETAMINOPHEN 325 MG/1
650 TABLET ORAL EVERY 6 HOURS PRN
Status: DISCONTINUED | OUTPATIENT
Start: 2025-04-10 | End: 2025-04-10 | Stop reason: HOSPADM

## 2025-04-10 RX ORDER — ONDANSETRON 2 MG/ML
4 INJECTION INTRAMUSCULAR; INTRAVENOUS ONCE AS NEEDED
Status: DISCONTINUED | OUTPATIENT
Start: 2025-04-10 | End: 2025-04-10 | Stop reason: HOSPADM

## 2025-04-10 RX ORDER — HYDROMORPHONE HCL/PF 1 MG/ML
0.5 SYRINGE (ML) INJECTION
Status: DISCONTINUED | OUTPATIENT
Start: 2025-04-10 | End: 2025-04-10 | Stop reason: HOSPADM

## 2025-04-10 RX ORDER — PROPOFOL 10 MG/ML
INJECTION, EMULSION INTRAVENOUS AS NEEDED
Status: DISCONTINUED | OUTPATIENT
Start: 2025-04-10 | End: 2025-04-10

## 2025-04-10 RX ORDER — BUPIVACAINE HYDROCHLORIDE 2.5 MG/ML
INJECTION, SOLUTION EPIDURAL; INFILTRATION; INTRACAUDAL; PERINEURAL AS NEEDED
Status: DISCONTINUED | OUTPATIENT
Start: 2025-04-10 | End: 2025-04-10 | Stop reason: HOSPADM

## 2025-04-10 RX ORDER — ONDANSETRON 2 MG/ML
4 INJECTION INTRAMUSCULAR; INTRAVENOUS EVERY 6 HOURS PRN
Status: DISCONTINUED | OUTPATIENT
Start: 2025-04-10 | End: 2025-04-10 | Stop reason: HOSPADM

## 2025-04-10 RX ORDER — IBUPROFEN 600 MG/1
600 TABLET, FILM COATED ORAL EVERY 6 HOURS PRN
Qty: 30 TABLET | Refills: 0 | Status: SHIPPED | OUTPATIENT
Start: 2025-04-10

## 2025-04-10 RX ORDER — OXYCODONE HYDROCHLORIDE 5 MG/1
5 TABLET ORAL EVERY 4 HOURS PRN
Qty: 5 TABLET | Refills: 0 | Status: SHIPPED | OUTPATIENT
Start: 2025-04-10 | End: 2025-04-15

## 2025-04-10 RX ORDER — MAGNESIUM HYDROXIDE 1200 MG/15ML
LIQUID ORAL AS NEEDED
Status: DISCONTINUED | OUTPATIENT
Start: 2025-04-10 | End: 2025-04-10 | Stop reason: HOSPADM

## 2025-04-10 RX ORDER — PROPOFOL 10 MG/ML
INJECTION, EMULSION INTRAVENOUS CONTINUOUS PRN
Status: DISCONTINUED | OUTPATIENT
Start: 2025-04-10 | End: 2025-04-10

## 2025-04-10 RX ORDER — MEPERIDINE HYDROCHLORIDE 25 MG/ML
12.5 INJECTION INTRAMUSCULAR; INTRAVENOUS; SUBCUTANEOUS
Status: DISCONTINUED | OUTPATIENT
Start: 2025-04-10 | End: 2025-04-10 | Stop reason: HOSPADM

## 2025-04-10 RX ORDER — ROCURONIUM BROMIDE 10 MG/ML
INJECTION, SOLUTION INTRAVENOUS AS NEEDED
Status: DISCONTINUED | OUTPATIENT
Start: 2025-04-10 | End: 2025-04-10

## 2025-04-10 RX ORDER — ONDANSETRON 2 MG/ML
INJECTION INTRAMUSCULAR; INTRAVENOUS AS NEEDED
Status: DISCONTINUED | OUTPATIENT
Start: 2025-04-10 | End: 2025-04-10

## 2025-04-10 RX ORDER — SODIUM CHLORIDE, SODIUM LACTATE, POTASSIUM CHLORIDE, CALCIUM CHLORIDE 600; 310; 30; 20 MG/100ML; MG/100ML; MG/100ML; MG/100ML
INJECTION, SOLUTION INTRAVENOUS CONTINUOUS PRN
Status: DISCONTINUED | OUTPATIENT
Start: 2025-04-10 | End: 2025-04-10

## 2025-04-10 RX ORDER — IBUPROFEN 600 MG/1
600 TABLET, FILM COATED ORAL EVERY 6 HOURS PRN
Status: DISCONTINUED | OUTPATIENT
Start: 2025-04-10 | End: 2025-04-10 | Stop reason: HOSPADM

## 2025-04-10 RX ORDER — GLYCOPYRROLATE 0.2 MG/ML
INJECTION INTRAMUSCULAR; INTRAVENOUS AS NEEDED
Status: DISCONTINUED | OUTPATIENT
Start: 2025-04-10 | End: 2025-04-10

## 2025-04-10 RX ORDER — LIDOCAINE HYDROCHLORIDE 20 MG/ML
INJECTION, SOLUTION EPIDURAL; INFILTRATION; INTRACAUDAL; PERINEURAL AS NEEDED
Status: DISCONTINUED | OUTPATIENT
Start: 2025-04-10 | End: 2025-04-10

## 2025-04-10 RX ADMIN — ONDANSETRON 4 MG: 2 INJECTION INTRAMUSCULAR; INTRAVENOUS at 17:46

## 2025-04-10 RX ADMIN — PROPOFOL 200 MG: 10 INJECTION, EMULSION INTRAVENOUS at 17:43

## 2025-04-10 RX ADMIN — SUGAMMADEX 200 MG: 100 INJECTION, SOLUTION INTRAVENOUS at 18:36

## 2025-04-10 RX ADMIN — ROCURONIUM 10 MG: 50 INJECTION, SOLUTION INTRAVENOUS at 18:19

## 2025-04-10 RX ADMIN — FENTANYL CITRATE 100 MCG: 50 INJECTION INTRAMUSCULAR; INTRAVENOUS at 17:43

## 2025-04-10 RX ADMIN — KETOROLAC TROMETHAMINE 30 MG: 30 INJECTION, SOLUTION INTRAMUSCULAR; INTRAVENOUS at 18:36

## 2025-04-10 RX ADMIN — ROCURONIUM 50 MG: 50 INJECTION, SOLUTION INTRAVENOUS at 17:43

## 2025-04-10 RX ADMIN — SODIUM CHLORIDE, SODIUM LACTATE, POTASSIUM CHLORIDE, AND CALCIUM CHLORIDE: .6; .31; .03; .02 INJECTION, SOLUTION INTRAVENOUS at 17:39

## 2025-04-10 RX ADMIN — PROPOFOL 50 MCG/KG/MIN: 10 INJECTION, EMULSION INTRAVENOUS at 17:46

## 2025-04-10 RX ADMIN — MIDAZOLAM 2 MG: 1 INJECTION INTRAMUSCULAR; INTRAVENOUS at 17:38

## 2025-04-10 RX ADMIN — DEXAMETHASONE SODIUM PHOSPHATE 10 MG: 10 INJECTION, SOLUTION INTRAMUSCULAR; INTRAVENOUS at 17:46

## 2025-04-10 RX ADMIN — ACETAMINOPHEN 650 MG: 325 TABLET, FILM COATED ORAL at 19:48

## 2025-04-10 RX ADMIN — LIDOCAINE HYDROCHLORIDE 100 MG: 20 INJECTION, SOLUTION EPIDURAL; INFILTRATION; INTRACAUDAL at 17:43

## 2025-04-10 RX ADMIN — GLYCOPYRROLATE 0.1 MG: 0.2 INJECTION, SOLUTION INTRAMUSCULAR; INTRAVENOUS at 17:54

## 2025-04-10 NOTE — ANESTHESIA POSTPROCEDURE EVALUATION
Post-Op Assessment Note    CV Status:  Stable    Pain management: adequate       Mental Status:  Awake   Hydration Status:  Euvolemic   PONV Controlled:  Controlled   Airway Patency:  Patent  Two or more mitigation strategies used for obstructive sleep apnea   Post Op Vitals Reviewed: Yes    No anethesia notable event occurred.    Staff: Anesthesiologist, CRNA           Last Filed PACU Vitals:  Vitals Value Taken Time   Temp 97.2 °F (36.2 °C) 04/10/25 1850   Pulse 92 04/10/25 1850   /75 04/10/25 1850   Resp 16 04/10/25 1850   SpO2 94 % 04/10/25 1850

## 2025-04-10 NOTE — DISCHARGE INSTR - AVS FIRST PAGE
"Post-Gynecologic Surgery Discharge Instructions:  Call the office for fever greater than 100.4F, heavy vaginal bleeding, or increasing pain    Post Operative Pain Management:  For pain, you may take 650mg of Tylenol every 6 hours  For cramping, you may take 600mg of ibuprofen every 6 hours    You can alternate Tylenol and ibuprofen and take them \"around the clock\" to stay ahead of pain. For example, take 650mg of Tylenol at 9 AM, then 600mg of ibuprofen at 12 PM, then 650mg of Tylenol at 3 PM, and so forth.     If you have any questions regarding your post-operative course, please call your doctor.  "

## 2025-04-10 NOTE — ANESTHESIA POSTPROCEDURE EVALUATION
Post-Op Assessment Note    CV Status:  Stable    Pain management: adequate       Mental Status:  Awake   Hydration Status:  Stable   PONV Controlled:  Controlled   Airway Patency:  Patent     Post Op Vitals Reviewed: Yes    No anethesia notable event occurred.    Staff: Anesthesiologist           Last Filed PACU Vitals:  Vitals Value Taken Time   Temp 97.6 °F (36.4 °C) 04/10/25 1918   Pulse 76 04/10/25 1925   /90 04/10/25 1918   Resp 20 04/10/25 1918   SpO2 97 % 04/10/25 1925   Vitals shown include unfiled device data.    Modified Kamala:     Vitals Value Taken Time   Activity 2 04/10/25 1918   Respiration 2 04/10/25 1918   Circulation 2 04/10/25 1918   Consciousness 2 04/10/25 1918   Oxygen Saturation 2 04/10/25 1918     Modified Kamala Score: 10
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
## 2025-04-10 NOTE — ANESTHESIA PREPROCEDURE EVALUATION
Procedure:  SALPINGECTOMY, LAPAROSCOPIC; EXAM UNDER ANESTHESIA (Bilateral: Uterus)    Relevant Problems   ANESTHESIA (within normal limits)      GI/HEPATIC  Elevated LFTs      /RENAL (within normal limits)      HEMATOLOGY (within normal limits)      MUSCULOSKELETAL (within normal limits)      NEURO/PSYCH   (+) Tension type headache      PULMONARY (within normal limits)        Physical Exam    Airway    Mallampati score: II         Dental   No notable dental hx     Cardiovascular  Cardiovascular exam normal    Pulmonary  Pulmonary exam normal Breath sounds clear to auscultation    Other Findings  post-pubertal.      Anesthesia Plan  ASA Score- 2     Anesthesia Type- general with ASA Monitors.         Additional Monitors:     Airway Plan: ETT.           Plan Factors-    Chart reviewed.   Existing labs reviewed. Patient summary reviewed.    Patient is not a current smoker.              Induction- intravenous.    Postoperative Plan-         Informed Consent- Anesthetic plan and risks discussed with patient.        NPO Status:  Vitals Value Taken Time   Date of last liquid 04/10/25 04/10/25 1427   Time of last liquid 1200 04/10/25 1427   Date of last solid 04/09/25 04/10/25 1427   Time of last solid 1800 04/10/25 1427

## 2025-04-14 ENCOUNTER — TELEPHONE (OUTPATIENT)
Age: 40
End: 2025-04-14

## 2025-04-14 PROCEDURE — 88302 TISSUE EXAM BY PATHOLOGIST: CPT | Performed by: PATHOLOGY

## 2025-04-14 NOTE — TELEPHONE ENCOUNTER
Patient calling in with questions regarding resuming activity after sterilization- b/l lap salpingectomy.    Advised normal activity can be resumed after 1 week if the patient is feeling back to normal- no pain, incisions healing and free from s/s infection.

## 2025-04-29 NOTE — TELEPHONE ENCOUNTER
Called pt and lvm to call office and reschedule OB Intake, as Evette will be out of the office on her original appt date of 8/2.   
You can access the FollowMyHealth Patient Portal offered by [facility] by registering at the following website: [email or web address]. By joining [facility] FollowMyHealth portal, you will also be able to view your health information using other applications (apps) compatible with our system.

## (undated) DEVICE — PREMIUM DRY TRAY LF: Brand: MEDLINE INDUSTRIES, INC.

## (undated) DEVICE — TROCAR: Brand: KII SLEEVE

## (undated) DEVICE — EXOFIN PRECISION PEN HIGH VISCOSITY TOPICAL SKIN ADHESIVE: Brand: EXOFIN PRECISION PEN, 1G

## (undated) DEVICE — MARYLAND JAW LAPAROSCOPIC SEALER/DIVIDER COATED: Brand: LIGASURE

## (undated) DEVICE — [HIGH FLOW INSUFFLATOR,  DO NOT USE IF PACKAGE IS DAMAGED,  KEEP DRY,  KEEP AWAY FROM SUNLIGHT,  PROTECT FROM HEAT AND RADIOACTIVE SOURCES.]: Brand: PNEUMOSURE

## (undated) DEVICE — GLOVE INDICATOR PI UNDERGLOVE SZ 6.5 BLUE

## (undated) DEVICE — REM POLYHESIVE ADULT PATIENT RETURN ELECTRODE: Brand: VALLEYLAB

## (undated) DEVICE — BETHLEHEM UNIVERSAL GYN LAP PK: Brand: CARDINAL HEALTH

## (undated) DEVICE — SUT PLAIN 3-0 PS-1 27 IN 1640H

## (undated) DEVICE — TISSUE RETRIEVAL SYSTEM: Brand: INZII RETRIEVAL SYSTEM

## (undated) DEVICE — SCD SEQUENTIAL COMPRESSION COMFORT SLEEVE MEDIUM KNEE LENGTH: Brand: KENDALL SCD

## (undated) DEVICE — BLUE HEAT SCOPE WARMER

## (undated) DEVICE — CHLORAPREP HI-LITE 26ML ORANGE

## (undated) DEVICE — INTENDED FOR TISSUE SEPARATION, AND OTHER PROCEDURES THAT REQUIRE A SHARP SURGICAL BLADE TO PUNCTURE OR CUT.: Brand: BARD-PARKER SAFETY BLADES SIZE 11, STERILE

## (undated) DEVICE — SUT MONOCRYL 4-0 PS-2 27 IN Y426H

## (undated) DEVICE — GLOVE PI ULTRA TOUCH SZ.6.5

## (undated) DEVICE — DRAPE EQUIPMENT RF WAND